# Patient Record
Sex: FEMALE | Race: WHITE | Employment: OTHER | URBAN - METROPOLITAN AREA
[De-identification: names, ages, dates, MRNs, and addresses within clinical notes are randomized per-mention and may not be internally consistent; named-entity substitution may affect disease eponyms.]

---

## 2017-04-19 ENCOUNTER — HOSPITAL ENCOUNTER (OUTPATIENT)
Dept: MAMMOGRAPHY | Age: 72
Discharge: HOME | End: 2017-04-19
Attending: FAMILY MEDICINE
Payer: MEDICARE

## 2017-04-19 DIAGNOSIS — R92.8 ABNORMAL MAMMOGRAM: ICD-10-CM

## 2017-04-19 PROCEDURE — 77066 DX MAMMO INCL CAD BI: CPT

## 2017-04-19 PROCEDURE — 76642 ULTRASOUND BREAST LIMITED: CPT

## 2018-03-27 PROBLEM — E66.01 SEVERE OBESITY (BMI 35.0-39.9) WITH COMORBIDITY (HCC): Status: ACTIVE | Noted: 2018-03-27

## 2018-04-30 ENCOUNTER — HOSPITAL ENCOUNTER (OUTPATIENT)
Dept: MAMMOGRAPHY | Age: 73
Discharge: HOME | End: 2018-04-30
Attending: FAMILY MEDICINE
Payer: MEDICARE

## 2018-04-30 DIAGNOSIS — Z12.31 VISIT FOR SCREENING MAMMOGRAM: ICD-10-CM

## 2018-04-30 PROCEDURE — 77067 SCR MAMMO BI INCL CAD: CPT

## 2018-07-12 ENCOUNTER — HOSPITAL ENCOUNTER (OUTPATIENT)
Dept: ULTRASOUND IMAGING | Age: 73
Discharge: HOME | End: 2018-07-12
Attending: FAMILY MEDICINE
Payer: MEDICARE

## 2018-07-12 DIAGNOSIS — M79.662 PAIN OF LEFT CALF: ICD-10-CM

## 2018-07-12 PROCEDURE — 93971 EXTREMITY STUDY: CPT

## 2018-11-05 ENCOUNTER — PATIENT OUTREACH (OUTPATIENT)
Dept: CASE MANAGEMENT | Age: 73
End: 2018-11-05

## 2018-11-05 NOTE — PROGRESS NOTES
Pt identified as MSSP Risk Level 4. Community Care Manager reviewed chart to evaluate for possible Complex Care Management needs. No complex care needs identified at present. Please consult community care management team if future needs arise. This note will not be viewable in [address]

## 2019-01-02 PROBLEM — Z79.899 ENCOUNTER FOR MEDICATION MANAGEMENT: Status: ACTIVE | Noted: 2019-01-02

## 2019-01-02 PROBLEM — R25.1 TREMOR: Status: ACTIVE | Noted: 2019-01-02

## 2019-01-02 PROBLEM — R29.3 POSTURAL IMBALANCE: Status: ACTIVE | Noted: 2019-01-02

## 2019-01-02 PROBLEM — G25.81 RESTLESS LEGS SYNDROME (RLS): Status: ACTIVE | Noted: 2019-01-02
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
## 2019-05-09 ENCOUNTER — HOSPITAL ENCOUNTER (OUTPATIENT)
Dept: MAMMOGRAPHY | Age: 74
Discharge: HOME | End: 2019-05-09
Attending: FAMILY MEDICINE
Payer: MEDICARE

## 2019-05-09 DIAGNOSIS — Z12.31 SCREENING MAMMOGRAM, ENCOUNTER FOR: ICD-10-CM

## 2019-05-09 PROCEDURE — 77067 SCR MAMMO BI INCL CAD: CPT

## 2019-05-15 ENCOUNTER — HOSPITAL ENCOUNTER (OUTPATIENT)
Dept: MAMMOGRAPHY | Age: 74
Discharge: HOME | End: 2019-05-15
Attending: FAMILY MEDICINE
Payer: MEDICARE

## 2019-05-15 DIAGNOSIS — R92.8 ABNORMAL SCREENING MAMMOGRAM: ICD-10-CM

## 2019-05-15 PROCEDURE — 76642 ULTRASOUND BREAST LIMITED: CPT

## 2019-05-15 PROCEDURE — 77065 DX MAMMO INCL CAD UNI: CPT

## 2020-01-09 ENCOUNTER — HOSPITAL ENCOUNTER (OUTPATIENT)
Dept: MAMMOGRAPHY | Age: 75
Discharge: HOME | End: 2020-01-09
Attending: FAMILY MEDICINE
Payer: MEDICARE

## 2020-01-09 ENCOUNTER — HOSPITAL ENCOUNTER (OUTPATIENT)
Dept: MAMMOGRAPHY | Age: 75
Discharge: HOME | End: 2020-01-09
Payer: MEDICARE

## 2020-01-09 DIAGNOSIS — N63.0 BREAST LUMP IN UPPER OUTER QUADRANT: ICD-10-CM

## 2020-01-09 DIAGNOSIS — R92.8 ABNORMAL MAMMOGRAM: ICD-10-CM

## 2020-01-09 PROCEDURE — 77066 DX MAMMO INCL CAD BI: CPT

## 2021-02-25 ENCOUNTER — TRANSCRIBE ORDER (OUTPATIENT)
Dept: SCHEDULING | Age: 76
End: 2021-02-25

## 2021-02-25 DIAGNOSIS — Z12.31 VISIT FOR SCREENING MAMMOGRAM: Primary | ICD-10-CM

## 2021-03-24 ENCOUNTER — HOSPITAL ENCOUNTER (OUTPATIENT)
Dept: MAMMOGRAPHY | Age: 76
Discharge: HOME | End: 2021-03-24
Attending: FAMILY MEDICINE
Payer: MEDICARE

## 2021-03-24 DIAGNOSIS — Z12.31 VISIT FOR SCREENING MAMMOGRAM: ICD-10-CM

## 2021-03-24 PROCEDURE — 77067 SCR MAMMO BI INCL CAD: CPT

## 2021-03-31 ENCOUNTER — HOSPITAL ENCOUNTER (OUTPATIENT)
Dept: MAMMOGRAPHY | Age: 76
Discharge: HOME | End: 2021-03-31
Attending: FAMILY MEDICINE
Payer: MEDICARE

## 2021-03-31 DIAGNOSIS — R92.8 ABNORMAL MAMMOGRAM: ICD-10-CM

## 2021-03-31 PROCEDURE — 77065 DX MAMMO INCL CAD UNI: CPT

## 2021-03-31 PROCEDURE — 76642 ULTRASOUND BREAST LIMITED: CPT

## 2021-04-07 ENCOUNTER — HOSPITAL ENCOUNTER (OUTPATIENT)
Dept: MAMMOGRAPHY | Age: 76
Discharge: HOME | End: 2021-04-07
Attending: FAMILY MEDICINE
Payer: MEDICARE

## 2021-04-07 VITALS — DIASTOLIC BLOOD PRESSURE: 67 MMHG | HEART RATE: 83 BPM | SYSTOLIC BLOOD PRESSURE: 161 MMHG

## 2021-04-07 DIAGNOSIS — N63.20 LEFT BREAST MASS: ICD-10-CM

## 2021-04-07 DIAGNOSIS — N63.0 BREAST MASS: ICD-10-CM

## 2021-04-07 PROCEDURE — 77065 DX MAMMO INCL CAD UNI: CPT

## 2021-04-07 PROCEDURE — 88305 TISSUE EXAM BY PATHOLOGIST: CPT

## 2021-04-07 PROCEDURE — 77030013267 US BX BREAST LT 1ST LESION W/CLIP AND SPECIMEN

## 2021-04-07 PROCEDURE — 74011000250 HC RX REV CODE- 250

## 2021-04-07 RX ORDER — LIDOCAINE HYDROCHLORIDE 10 MG/ML
3 INJECTION INFILTRATION; PERINEURAL
Status: COMPLETED | OUTPATIENT
Start: 2021-04-07 | End: 2021-04-07

## 2021-04-07 RX ADMIN — LIDOCAINE HYDROCHLORIDE 3 ML: 10 INJECTION, SOLUTION INFILTRATION; PERINEURAL at 09:23

## 2021-04-08 NOTE — PROGRESS NOTES
I spoke with Ms Mike Thorne regarding the results of her Lt breast U/S bx. Pathology: benign. She had no post bx issues or concerns. She is being referred to CS for surgical consult. I have LM for [address] for 1st available and will call pt back when appt has been made.

## 2021-05-11 NOTE — H&P (VIEW-ONLY)
Idell Pallas, DO 
[address], [address] [place] Phone [phone]   Fax [phone] Date of visit: 2021 Primary/Requesting provider: Clau Islas DO Chief Complaint Patient presents with  Follow-up  
  left breast radial scar Name: Marcus Cabrera MRN: [id number] : 1945 Age: 66 y.o. Sex: female PCP: Clau Islas DO  
 
 
CC:   
Chief Complaint Patient presents with  Follow-up  
  left breast radial scar HPI: 
 
 Marcus Cabrera is a 66 y.o. female who presents for evaluation of left breast radial scar. Patient follows up in the office today to discuss details of procedure with Dr. Codi Sutherland. I was able to review her case and speak to him by telephone and left breast mastectomy with reconstruction and right breast reduction mammoplasty are planned. Today we discussed the procedure in detail. I discussed my part of the procedure details. She reports no new changes. She is a left breast radial scar and she is here today to discuss and plan for surgery. Constantino Cabrera is a 66 y.o. female who presents for evaluation of left breast radial scar. This is a 75-year-old female with previous personal history of left breast cancer in . Patient developed breast cancer and underwent a left breast partial mastectomy with no lymph node interrogation. There is no pathology for review. Postoperatively the patient completed radiation therapy to the left breast and now has a contracted left breast.  She has been very diligent with her self breast exams and annual screening mammograms. On recent screening mammogram she developed a suspicious abnormality which is led to biopsy. Biopsy has revealed a left breast radial scar. She is here today for evaluation of radial scar and possible surgery. The patient has several concerns.   She states that her right breast since radiation is significantly larger than the left breast.  She also has deformity of the left breast after the radiation. Upfront she states that she is considering 4 4 possibilities. #1 do nothing. #2 partial mastectomy. #3 bilateral mastectomy with reconstruction. #4 bilateral mastectomy without reconstruction. She is here today for evaluation for radial scar and surgical recommendations. .  
  
 DIAGNOSIS  
A: \" LEFT BREAST, 12:00 POSITION, 3 CM FROM NIPPLE, CORE BIOPSY\":  
FIBROCYSTIC MASTOPATHY HAVING RADIAL SCAR, MODERATE INTRADUCTAL HYPERPLASIA, APOCRINE METAPLASIA, MICROCALCIFICATIONS AND FOCAL FAT NECROSIS  
jtl/4/8/2021 Electronically signed out on 4/8/2021 06:00 by SHERRY Reich M.D.  
  
LEFT DIAGNOSTIC DIGITAL MAMMOGRAPHY (ADDITIONAL VIEWS), 
LEFT BREAST SONOGRAPHY:  
  
CLINICAL HISTORY:  Followup indeterminate screening mammogram in a 40-year-old 
status post remote left lumpectomy and radiation therapy. 
  
LEFT MAMMOGRAM:  Straight lateral and spot compression views are compared with 
March 24, 2021 and earlier studies.  The presence of a small superficial nodule 
with ill-defined margins at the anterior 12:00 position corresponding to the 
finding on the screening mammogram. Rubio Rain is an adjacent coarse calcification. 
  
LEFT BREAST ULTRASOUND:  Images from targeted ultrasound evaluation demonstrate 
an 8 mm heterogeneous hypoechoic nodule associated with acoustical shadowing 
superficially at the 12:00 position 3 cm from the nipple corresponding well in 
size, configuration, and position with the mammographic nodule.  While fat 
necrosis and other benign processes are possible, the possibility of malignancy 
is suggested in this patient with a history of ipsilateral breast cancer. Biopsy is indicated. 
  
IMPRESSION 
  
AN 8 MM HETEROGENEOUS SHADOWING ANTERIOR 12:00 NODULE IS SUSPICIOUS FOR 
MALIGNANCY, AND BIOPSY IS RECOMMENDED.  
  
  
BI-RADS Assessment Category 4: Suspicious Finding- Biopsy should be considered. 
  
Preliminary results were reported by Jose Members to the nurse line voicemail 
in Dr. Kodak Clifford office at 15:58 hours. Suzy Cross patient has been scheduled for left 
breast biopsy with ultrasound guidance on April 7, 2021 at 09:00 hours. 
  
 
PMH: 
 
Past Medical History:  
Diagnosis Date  Abnormal serum cholesterol  Allergic rhinitis  Arthritis   
 bilat hands & toes  Asthma  Breast cancer ([address]  Cancer [facility]) 1998  
 lumpectomy left breast 1998 and radiation  Chronic knee pain 10/21/2016  Chronic sinusitis  Degenerative cervical spinal stenosis  Depression   
 depression - pt takes zoloft  Deviated nasal septum  Difficult intubation Grade 3 view on DL - glidescope used successfully  Disturbance of salivary secretion  GERD (gastroesophageal reflux disease)   
 controlled with medication  Hoarseness  Hypercholesterolemia   
 controlled with medication  Hyperlipidemia  Hypertension   
 controlled with medication  Hypokalemia 10/21/2016  Hypothyroidism  Multilevel degenerative disc disease  Musculoskeletal pain  Nausea & vomiting POV with GA - does not  Postmenopausal bleeding  Prolapse of female genital organs  Sinus problem  SNHL (sensorineural hearing loss)  Thyroid disease   
 hypo - controlled with medication  Thyroid nodule  Unspecified sleep apnea   
 pt wears CPAP at night  Uterine prolapse PSH: 
 
Past Surgical History:  
Procedure Laterality Date  HX APPENDECTOMY  HX BREAST LUMPECTOMY Left   
 left lumpectomy and radiation  HX HEENT    
 palatal surgery  HX HEENT    
 sinus  HX HEENT Right 9/29/11 Balloon frontal sinuplasty/ irrigation and culture max sinus  HX HYSTERECTOMY  HX KNEE REPLACEMENT Right 7/8/14 TKR  HX KNEE REPLACEMENT Left TKR  HX ORTHOPAEDIC    
 finger growths  HX ORTHOPAEDIC    
 left thumb  HX ORTHOPAEDIC left foot bunion  HX ORTHOPAEDIC Right 4/2016 Toes on right foot  HX SEPTOPLASTY  7/8/04 Septo/ FESS  
 HX TONSILLECTOMY  HX UVULOPALATOPHARYNGOPLASTY  GA CYSTOTOMY,EXCIS BLADDER TUMOR    
 cysto X 8- last one 2016 MEDS: 
 
Current Outpatient Medications Medication Sig  
 fluticasone propionate (FLONASE) 50 mcg/actuation nasal spray SPRAY 1 SPRAY INTO EACH NOSTRIL TWICE A DAY  celecoxib (CELEBREX) 200 mg capsule  budesonide-formoteroL (SYMBICORT) 160-4.5 mcg/actuation HFAA Take 2 Puffs by inhalation two (2) times a day.  albuterol (PROVENTIL HFA, VENTOLIN HFA, PROAIR HFA) 90 mcg/actuation inhaler TAKE 2 PUFFS BY MOUTH EVERY 6 HOURS AS NEEDED FOR WHEEZE  esomeprazole (NEXIUM) 40 mg capsule TAKE ONE CAPSULE BY MOUTH TWICE A DAY  venlafaxine-SR (EFFEXOR-XR) 75 mg capsule TAKE 3 CAPSULES BY MOUTH IN THE MORNING  
 atorvastatin (LIPITOR) 20 mg tablet TAKE 1 TAB BY MOUTH DAILY.  levothyroxine (SYNTHROID) 200 mcg tablet TAKE 1 TAB BY MOUTH DAILY (BEFORE BREAKFAST).  varicella-zoster recombinant, PF, (SHINGRIX, PF,) 50 mcg/0.5 mL susr injection 0.5mL by IntraMUSCular route once now and then repeat in 2-6 months  levothyroxine (SYNTHROID) 75 mcg tablet TAKE 1 TABLET EVERY MORNING BEFORE BREAKFAST  hydroCHLOROthiazide (HYDRODIURIL) 25 mg tablet TAKE 1 TABLET EVERY DAY  amLODIPine (NORVASC) 10 mg tablet TAKE 1 TAB BY MOUTH DAILY.  metoprolol succinate (TOPROL-XL) 50 mg XL tablet Take 1 Tab by mouth daily.  potassium chloride (KLOR-CON M20) 20 mEq tablet Take 1 Tab by mouth two (2) times a day.  desloratadine (CLARINEX) 5 mg tablet Take 5 mg by mouth every morning.  azelastine (ASTELIN) 137 mcg (0.1 %) nasal spray 1 [place] by Both Nostrils route two (2) times a day. Use in each nostril as directed  cholecalciferol (VITAMIN D3) 1,000 unit cap Take  by mouth every morning. Last dose 3/28/16  Magnesium Oxide 500 mg cap Take  by mouth every morning. Last dose 3/28/16  GLUCOSAMINE/CONDROITIN/HRB#182 (COSAMIN ASU PO) Take  by mouth every morning. Last dose 3/28/16  CALCIUM CARB/VIT D3/MINERALS (CALTRATE PLUS PO) take  by mouth every morning.  VITAMIN E ACETATE (VITAMIN E PO) Take  by mouth every morning. Last dose 3/28/16  MULTIVITAMINS W-MINERALS/LUT (CENTRUM SILVER PO) Take  by mouth every morning. Last dose 3/28/16  aspirin 81 mg Tab Take 81 mg by mouth nightly. LAST DOSE 3/28/16 No current facility-administered medications for this visit. ALLERGIES:   
 
Allergies Allergen Reactions  Cefaclor Hives and Diarrhea IV OK Other reaction(s): Hives/Swelling-Allergy  Cefdinir Hives and Diarrhea IV OK Other reaction(s): Hives/Swelling-Allergy  Cefuroxime Axetil Hives and Diarrhea IV OK Other reaction(s): Hives/Swelling-Allergy  Clarithromycin Hives and Diarrhea IV is OK Other reaction(s): Hives/Swelling-Allergy  Fluconazole Hives and Diarrhea Other reaction(s): Hives/Swelling-Allergy  Moxifloxacin Hives and Diarrhea IV is OK Other reaction(s): Hives/Swelling-Allergy  Penicillins Hives and Diarrhea IV OK Other reaction(s): Diarrhea-Intolerance, Hives/Swelling-Allergy  Shellfish Containing Products Hives and Diarrhea Other reaction(s): Hives/Swelling-Allergy  Ciprocinonide Hives and Diarrhea IV OK  Other Medication Diarrhea Carrots, pork & cheese cause diarrhea - LACTOSE INTOLERANT. Pine & mold causes upper respiratory complications.  Other Plant, Animal, Environmental Unknown (comments) Mold spores, pine  Cephalexin Other (comments) Abdominal pain Other reaction(s): Other- (not listed) - Allergy Abdominal pain/diarrhea SH: Social History Tobacco Use  Smoking status: Never Smoker  Smokeless tobacco: Never Used Substance Use Topics  Alcohol use: Yes Comment: occ beer  Drug use: No  
 
 
FH: 
 
Family History Problem Relation Age of Onset  Hypertension Mother  Stroke Mother  Hypertension Sister  Hypertension Other  Elevated Lipids Other Review of Systems Constitutional: Negative. HENT: Negative. Eyes: Negative. Respiratory: Negative. Cardiovascular: Negative. Gastrointestinal: Negative. Genitourinary: Negative. Musculoskeletal: Negative. Skin:  
     Left breast radial scar Neurological: Negative. Endo/Heme/Allergies: Negative. Psychiatric/Behavioral: Negative. Physical Exam:  
 
Visit Vitals /84 Pulse 70 Wt 240 lb (108.9 kg) BMI 37.59 kg/m² Physical Exam 
HENT:  
   Head: Normocephalic and atraumatic. Eyes:  
   Pupils: Pupils are equal, round, and reactive to light. Neck: Musculoskeletal: Normal range of motion and neck supple. Thyroid: No thyromegaly. Trachea: No tracheal deviation. Cardiovascular:  
   Rate and Rhythm: Normal rate and regular rhythm. Heart sounds: Normal heart sounds. No murmur. Pulmonary:  
   Effort: Pulmonary effort is normal. No respiratory distress. Breath sounds: Normal breath sounds. No wheezing or rales. Abdominal:  
   General: Bowel sounds are normal. There is no distension. Palpations: Abdomen is soft. There is no mass. Tenderness: There is no abdominal tenderness. There is no guarding or rebound. Musculoskeletal: Normal range of motion. Skin: 
   General: Skin is warm and dry. Findings: No erythema. Neurological:  
   Mental Status: She is alert and oriented to person, place, and time. Psychiatric:     
   Mood and Affect: Mood normal.     
   Judgment: Judgment normal.  
 
 
 
Assessment/Plan:  Renetta Ulrich is a 66 y.o. female who has signs and symptoms consistent with left breast radial scar with personal history of breast cancer. Today we are planning left breast simple mastectomy with reconstruction by Dr. Tammie Rivas.   He will perform reduction mammoplasty on the right. We discussed the details the procedures and she will be scheduled next available date and time. ICD-10-CM ICD-9-CM 1. Radial scar of left breast  N64.89 611.89 I went through the risks of bleeding, infection and anesthesia. Counseling time:counseling time more than 50% of visit: 1 hour was utilized in office visit today 50% times a face-to-face discussion explaining details of the surgery. We discussed radial scar along with her personal history of breast cancer and radiation to that side. We discussed the potential of radial scar being upstaged to a cancer on final pathology. We all feel that it is best to perform a left breast mastectomy. She is ready to pursue reconstruction and reduction mammoplasty on the right. We discussed all the details and postoperative expectations and she will be scheduled next verbal date and time.  
 
Signed: Elo Bacon DO  
5/11/2021  3:18 PM

## 2021-05-17 ENCOUNTER — ANESTHESIA EVENT (OUTPATIENT)
Dept: SURGERY | Age: 76
End: 2021-05-17
Payer: MEDICARE

## 2021-05-18 ENCOUNTER — HOSPITAL ENCOUNTER (OUTPATIENT)
Age: 76
Setting detail: OUTPATIENT SURGERY
Discharge: HOME | End: 2021-05-18
Attending: SURGERY | Admitting: SURGERY
Payer: MEDICARE

## 2021-05-18 ENCOUNTER — ANESTHESIA (OUTPATIENT)
Dept: SURGERY | Age: 76
End: 2021-05-18
Payer: MEDICARE

## 2021-05-18 VITALS
HEART RATE: 69 BPM | WEIGHT: 239 LBS | SYSTOLIC BLOOD PRESSURE: 136 MMHG | BODY MASS INDEX: 39.77 KG/M2 | DIASTOLIC BLOOD PRESSURE: 61 MMHG | RESPIRATION RATE: 61 BRPM | TEMPERATURE: 97.4 F | OXYGEN SATURATION: 100 %

## 2021-05-18 DIAGNOSIS — L76.82 PAIN AT SURGICAL INCISION: Primary | ICD-10-CM

## 2021-05-18 DIAGNOSIS — N64.89 RADIAL SCAR OF LEFT BREAST: ICD-10-CM

## 2021-05-18 DIAGNOSIS — Z85.3 PERSONAL HISTORY OF BREAST CANCER: ICD-10-CM

## 2021-05-18 LAB
CREAT SERPL-MCNC: 0.76 MG/DL (ref 0.6–1)
HGB BLD-MCNC: 13.6 G/DL (ref 11.7–15.4)
POTASSIUM BLD-SCNC: 3.6 MMOL/L (ref 3.5–5.1)

## 2021-05-18 PROCEDURE — 77030008462 HC STPLR SKN PROX J&J -A: Performed by: SURGERY

## 2021-05-18 PROCEDURE — 77030002912 HC SUT ETHBND J&J -A: Performed by: SURGERY

## 2021-05-18 PROCEDURE — 76010000132 HC OR TIME 2.5 TO 3 HR: Performed by: SURGERY

## 2021-05-18 PROCEDURE — 74011250636 HC RX REV CODE- 250/636: Performed by: SURGERY

## 2021-05-18 PROCEDURE — 93005 ELECTROCARDIOGRAM TRACING: CPT | Performed by: ANESTHESIOLOGY

## 2021-05-18 PROCEDURE — 74011000250 HC RX REV CODE- 250: Performed by: NURSE ANESTHETIST, CERTIFIED REGISTERED

## 2021-05-18 PROCEDURE — 77030041445 HC PENCL ELECT SMK EVAC STRY -B: Performed by: SURGERY

## 2021-05-18 PROCEDURE — 74011250637 HC RX REV CODE- 250/637: Performed by: ANESTHESIOLOGY

## 2021-05-18 PROCEDURE — 77030002996 HC SUT SLK J&J -A: Performed by: SURGERY

## 2021-05-18 PROCEDURE — 77030038552 HC DRN WND MDII -A: Performed by: SURGERY

## 2021-05-18 PROCEDURE — 77030040361 HC SLV COMPR DVT MDII -B: Performed by: SURGERY

## 2021-05-18 PROCEDURE — 74011250636 HC RX REV CODE- 250/636: Performed by: ANESTHESIOLOGY

## 2021-05-18 PROCEDURE — 74011250636 HC RX REV CODE- 250/636: Performed by: NURSE ANESTHETIST, CERTIFIED REGISTERED

## 2021-05-18 PROCEDURE — 19303 MAST SIMPLE COMPLETE: CPT | Performed by: SURGERY

## 2021-05-18 PROCEDURE — 77030040922 HC BLNKT HYPOTHRM STRY -A: Performed by: ANESTHESIOLOGY

## 2021-05-18 PROCEDURE — 84132 ASSAY OF SERUM POTASSIUM: CPT

## 2021-05-18 PROCEDURE — 77030036554: Performed by: SURGERY

## 2021-05-18 PROCEDURE — 77030031139 HC SUT VCRL2 J&J -A: Performed by: SURGERY

## 2021-05-18 PROCEDURE — 82565 ASSAY OF CREATININE: CPT

## 2021-05-18 PROCEDURE — 77030002916 HC SUT ETHLN J&J -A: Performed by: SURGERY

## 2021-05-18 PROCEDURE — 85018 HEMOGLOBIN: CPT

## 2021-05-18 PROCEDURE — 77030021678 HC GLIDESCP STAT DISP VERT -B: Performed by: ANESTHESIOLOGY

## 2021-05-18 PROCEDURE — 76210000020 HC REC RM PH II FIRST 0.5 HR: Performed by: SURGERY

## 2021-05-18 PROCEDURE — 77030002934 HC SUT MCRYL J&J -B: Performed by: SURGERY

## 2021-05-18 PROCEDURE — 74011000250 HC RX REV CODE- 250: Performed by: SURGERY

## 2021-05-18 PROCEDURE — 76060000036 HC ANESTHESIA 2.5 TO 3 HR: Performed by: SURGERY

## 2021-05-18 PROCEDURE — 77030011283 HC ELECTRD NDL COVD -A: Performed by: SURGERY

## 2021-05-18 PROCEDURE — 77030011264 HC ELECTRD BLD EXT COVD -A: Performed by: SURGERY

## 2021-05-18 PROCEDURE — 2709999900 HC NON-CHARGEABLE SUPPLY: Performed by: SURGERY

## 2021-05-18 PROCEDURE — 77030037088 HC TUBE ENDOTRACH ORAL NSL COVD-A: Performed by: ANESTHESIOLOGY

## 2021-05-18 PROCEDURE — C1789 PROSTHESIS, BREAST, IMP: HCPCS | Performed by: SURGERY

## 2021-05-18 PROCEDURE — 88309 TISSUE EXAM BY PATHOLOGIST: CPT

## 2021-05-18 PROCEDURE — 77030008534 HC TBNG LIPOSUC BYRO -B: Performed by: SURGERY

## 2021-05-18 PROCEDURE — 76210000006 HC OR PH I REC 0.5 TO 1 HR: Performed by: SURGERY

## 2021-05-18 PROCEDURE — 77030008536 HC TBNG LIPO SUC GRAM -A: Performed by: SURGERY

## 2021-05-18 DEVICE — SILTEX MEDIUM HEIGHT TISSUE EXPANDER STYLE 9200, 350CC
Type: IMPLANTABLE DEVICE | Site: BREAST | Status: FUNCTIONAL
Brand: MENTOR CPX 4 BREAST TISSUE EXPANDER WITH SUTURE TABS

## 2021-05-18 RX ORDER — LIDOCAINE HYDROCHLORIDE 10 MG/ML
0.1 INJECTION INFILTRATION; PERINEURAL AS NEEDED
Status: DISCONTINUED | OUTPATIENT
Start: 2021-05-18 | End: 2021-05-18 | Stop reason: HOSPADM

## 2021-05-18 RX ORDER — CLINDAMYCIN PHOSPHATE 900 MG/50ML
900 INJECTION INTRAVENOUS ONCE
Status: COMPLETED | OUTPATIENT
Start: 2021-05-18 | End: 2021-05-18

## 2021-05-18 RX ORDER — OXYCODONE HYDROCHLORIDE 5 MG/1
5 TABLET ORAL
Status: DISCONTINUED | OUTPATIENT
Start: 2021-05-18 | End: 2021-05-18 | Stop reason: HOSPADM

## 2021-05-18 RX ORDER — NEOSTIGMINE METHYLSULFATE 1 MG/ML
INJECTION, SOLUTION INTRAVENOUS AS NEEDED
Status: DISCONTINUED | OUTPATIENT
Start: 2021-05-18 | End: 2021-05-18 | Stop reason: HOSPADM

## 2021-05-18 RX ORDER — ONDANSETRON 2 MG/ML
INJECTION INTRAMUSCULAR; INTRAVENOUS AS NEEDED
Status: DISCONTINUED | OUTPATIENT
Start: 2021-05-18 | End: 2021-05-18 | Stop reason: HOSPADM

## 2021-05-18 RX ORDER — DIAZEPAM 2 MG/1
5 TABLET ORAL
Qty: 20 TAB | Refills: 0 | Status: SHIPPED | OUTPATIENT
Start: 2021-05-18 | End: 2021-08-26 | Stop reason: ALTCHOICE

## 2021-05-18 RX ORDER — MIDAZOLAM HYDROCHLORIDE 1 MG/ML
2 INJECTION, SOLUTION INTRAMUSCULAR; INTRAVENOUS
Status: DISCONTINUED | OUTPATIENT
Start: 2021-05-18 | End: 2021-05-18 | Stop reason: HOSPADM

## 2021-05-18 RX ORDER — FENTANYL CITRATE 50 UG/ML
INJECTION, SOLUTION INTRAMUSCULAR; INTRAVENOUS AS NEEDED
Status: DISCONTINUED | OUTPATIENT
Start: 2021-05-18 | End: 2021-05-18 | Stop reason: HOSPADM

## 2021-05-18 RX ORDER — NALOXONE HYDROCHLORIDE 0.4 MG/ML
0.2 INJECTION, SOLUTION INTRAMUSCULAR; INTRAVENOUS; SUBCUTANEOUS
Status: DISCONTINUED | OUTPATIENT
Start: 2021-05-18 | End: 2021-05-18 | Stop reason: HOSPADM

## 2021-05-18 RX ORDER — PROPOFOL 10 MG/ML
INJECTION, EMULSION INTRAVENOUS AS NEEDED
Status: DISCONTINUED | OUTPATIENT
Start: 2021-05-18 | End: 2021-05-18 | Stop reason: HOSPADM

## 2021-05-18 RX ORDER — DEXAMETHASONE SODIUM PHOSPHATE 4 MG/ML
INJECTION, SOLUTION INTRA-ARTICULAR; INTRALESIONAL; INTRAMUSCULAR; INTRAVENOUS; SOFT TISSUE AS NEEDED
Status: DISCONTINUED | OUTPATIENT
Start: 2021-05-18 | End: 2021-05-18 | Stop reason: HOSPADM

## 2021-05-18 RX ORDER — GLYCOPYRROLATE 0.2 MG/ML
INJECTION INTRAMUSCULAR; INTRAVENOUS AS NEEDED
Status: DISCONTINUED | OUTPATIENT
Start: 2021-05-18 | End: 2021-05-18 | Stop reason: HOSPADM

## 2021-05-18 RX ORDER — ONDANSETRON 2 MG/ML
4 INJECTION INTRAMUSCULAR; INTRAVENOUS
Status: DISCONTINUED | OUTPATIENT
Start: 2021-05-18 | End: 2021-05-18 | Stop reason: HOSPADM

## 2021-05-18 RX ORDER — LIDOCAINE HYDROCHLORIDE 20 MG/ML
INJECTION, SOLUTION EPIDURAL; INFILTRATION; INTRACAUDAL; PERINEURAL AS NEEDED
Status: DISCONTINUED | OUTPATIENT
Start: 2021-05-18 | End: 2021-05-18 | Stop reason: HOSPADM

## 2021-05-18 RX ORDER — SODIUM CHLORIDE, SODIUM LACTATE, POTASSIUM CHLORIDE, CALCIUM CHLORIDE 600; 310; 30; 20 MG/100ML; MG/100ML; MG/100ML; MG/100ML
75 INJECTION, SOLUTION INTRAVENOUS CONTINUOUS
Status: DISCONTINUED | OUTPATIENT
Start: 2021-05-18 | End: 2021-05-18 | Stop reason: HOSPADM

## 2021-05-18 RX ORDER — NALOXONE HYDROCHLORIDE 0.4 MG/ML
0.2 INJECTION, SOLUTION INTRAMUSCULAR; INTRAVENOUS; SUBCUTANEOUS AS NEEDED
Status: DISCONTINUED | OUTPATIENT
Start: 2021-05-18 | End: 2021-05-18 | Stop reason: HOSPADM

## 2021-05-18 RX ORDER — HALOPERIDOL 5 MG/ML
1 INJECTION INTRAMUSCULAR
Status: DISCONTINUED | OUTPATIENT
Start: 2021-05-18 | End: 2021-05-18 | Stop reason: HOSPADM

## 2021-05-18 RX ORDER — BUPIVACAINE HYDROCHLORIDE AND EPINEPHRINE 2.5; 5 MG/ML; UG/ML
INJECTION, SOLUTION EPIDURAL; INFILTRATION; INTRACAUDAL; PERINEURAL AS NEEDED
Status: DISCONTINUED | OUTPATIENT
Start: 2021-05-18 | End: 2021-05-18 | Stop reason: HOSPADM

## 2021-05-18 RX ORDER — SULFAMETHOXAZOLE AND TRIMETHOPRIM 400; 80 MG/1; MG/1
1 TABLET ORAL 2 TIMES DAILY
Qty: 28 TAB | Refills: 0 | Status: SHIPPED | OUTPATIENT
Start: 2021-05-18 | End: 2021-08-26 | Stop reason: ALTCHOICE

## 2021-05-18 RX ORDER — FENTANYL CITRATE 50 UG/ML
100 INJECTION, SOLUTION INTRAMUSCULAR; INTRAVENOUS ONCE
Status: DISCONTINUED | OUTPATIENT
Start: 2021-05-18 | End: 2021-05-18 | Stop reason: HOSPADM

## 2021-05-18 RX ORDER — SODIUM CHLORIDE, SODIUM LACTATE, POTASSIUM CHLORIDE, CALCIUM CHLORIDE 600; 310; 30; 20 MG/100ML; MG/100ML; MG/100ML; MG/100ML
25 INJECTION, SOLUTION INTRAVENOUS CONTINUOUS
Status: DISCONTINUED | OUTPATIENT
Start: 2021-05-18 | End: 2021-05-18 | Stop reason: HOSPADM

## 2021-05-18 RX ORDER — HYDROCODONE BITARTRATE AND ACETAMINOPHEN 5; 325 MG/1; MG/1
1 TABLET ORAL
Qty: 30 TAB | Refills: 0 | Status: SHIPPED | OUTPATIENT
Start: 2021-05-18 | End: 2021-05-23

## 2021-05-18 RX ORDER — ACETAMINOPHEN 500 MG
1000 TABLET ORAL ONCE
Status: COMPLETED | OUTPATIENT
Start: 2021-05-18 | End: 2021-05-18

## 2021-05-18 RX ORDER — MIDAZOLAM HYDROCHLORIDE 1 MG/ML
2 INJECTION, SOLUTION INTRAMUSCULAR; INTRAVENOUS ONCE
Status: COMPLETED | OUTPATIENT
Start: 2021-05-18 | End: 2021-05-18

## 2021-05-18 RX ORDER — HYDROMORPHONE HYDROCHLORIDE 1 MG/ML
0.5 INJECTION, SOLUTION INTRAMUSCULAR; INTRAVENOUS; SUBCUTANEOUS
Status: DISCONTINUED | OUTPATIENT
Start: 2021-05-18 | End: 2021-05-18 | Stop reason: HOSPADM

## 2021-05-18 RX ORDER — ROCURONIUM BROMIDE 10 MG/ML
INJECTION, SOLUTION INTRAVENOUS AS NEEDED
Status: DISCONTINUED | OUTPATIENT
Start: 2021-05-18 | End: 2021-05-18 | Stop reason: HOSPADM

## 2021-05-18 RX ADMIN — LIDOCAINE HYDROCHLORIDE 60 MG: 20 INJECTION, SOLUTION EPIDURAL; INFILTRATION; INTRACAUDAL; PERINEURAL at 14:10

## 2021-05-18 RX ADMIN — ACETAMINOPHEN 1000 MG: 500 TABLET, FILM COATED ORAL at 12:00

## 2021-05-18 RX ADMIN — CLINDAMYCIN PHOSPHATE 900 MG: 900 INJECTION, SOLUTION INTRAVENOUS at 14:13

## 2021-05-18 RX ADMIN — GLYCOPYRROLATE 0.4 MG: 0.2 INJECTION, SOLUTION INTRAMUSCULAR; INTRAVENOUS at 16:22

## 2021-05-18 RX ADMIN — ROCURONIUM BROMIDE 50 MG: 10 INJECTION, SOLUTION INTRAVENOUS at 14:11

## 2021-05-18 RX ADMIN — Medication 3 MG: at 16:22

## 2021-05-18 RX ADMIN — DEXAMETHASONE SODIUM PHOSPHATE 4 MG: 4 INJECTION, SOLUTION INTRAMUSCULAR; INTRAVENOUS at 16:22

## 2021-05-18 RX ADMIN — MIDAZOLAM 2 MG: 1 INJECTION INTRAMUSCULAR; INTRAVENOUS at 13:56

## 2021-05-18 RX ADMIN — ONDANSETRON 4 MG: 2 INJECTION INTRAMUSCULAR; INTRAVENOUS at 16:22

## 2021-05-18 RX ADMIN — SODIUM CHLORIDE, SODIUM LACTATE, POTASSIUM CHLORIDE, AND CALCIUM CHLORIDE 25 ML/HR: 600; 310; 30; 20 INJECTION, SOLUTION INTRAVENOUS at 12:00

## 2021-05-18 RX ADMIN — FENTANYL CITRATE 100 MCG: 50 INJECTION INTRAMUSCULAR; INTRAVENOUS at 14:10

## 2021-05-18 RX ADMIN — PROPOFOL 150 MG: 10 INJECTION, EMULSION INTRAVENOUS at 14:10

## 2021-05-18 NOTE — PERIOP NOTES
at bedside. Discharge instructions reviewed with patient and her .  They voice understanding at this time with no questions or concerns

## 2021-05-18 NOTE — PERIOP NOTES
PACU DISCHARGE NOTE  Vital signs stable, pain well controlled, alert and oriented times three or at baseline, no anesthetic complications. IV removed with catheter tip intact. Written and verbal discharge instructions given, including pain control and follow up appointment. Spouse, Alexx verbalized understanding and signed discharge instructions. All questions answered prior to discharge. Dr Gulshan Odell reviewed EKG and has no orders regarding chest pain. Ekg reviewed with patient and spouse. Dr Gulshan Odell okay to discharge at this time. Pt and all belongings taken via wheelchair and safely put in vehicle. 07-Mar-2018 07-Mar-2018 08:55

## 2021-05-18 NOTE — ANESTHESIA PREPROCEDURE EVALUATION
Relevant Problems   RESPIRATORY SYSTEM   (+) Asthma      NEUROLOGY   (+) Depression      CARDIOVASCULAR   (+) Hypertension      GASTROINTESTINAL   (+) GERD (gastroesophageal reflux disease)      ENDOCRINE   (+) Hypothyroidism   (+) Severe obesity (BMI 35.0-39. 9) with comorbidity (HCC)       Anesthetic History     Increased risk of difficult airway (Grade 3 DL- successful glidescope use) and PONV          Review of Systems / Medical History  Patient summary reviewed, nursing notes reviewed and pertinent labs reviewed    Pulmonary        Sleep apnea: CPAP    Asthma : well controlled       Neuro/Psych         Psychiatric history     Cardiovascular    Hypertension: well controlled          Hyperlipidemia    Exercise tolerance: >4 METS     GI/Hepatic/Renal     GERD: well controlled           Endo/Other      Hypothyroidism: well controlled  Morbid obesity, arthritis and cancer     Other Findings            Physical Exam    Airway  Mallampati: III      Mouth opening: Normal     Cardiovascular  Regular rate and rhythm,  S1 and S2 normal,  no murmur, click, rub, or gallop             Dental  No notable dental hx       Pulmonary  Breath sounds clear to auscultation               Abdominal         Other Findings            Anesthetic Plan    ASA: 3  Anesthesia type: general          Induction: Intravenous  Anesthetic plan and risks discussed with: Patient      Plan glidescope

## 2021-05-18 NOTE — DISCHARGE INSTRUCTIONS
Keep dressings in place and dry until return to office   No lifting more than 5 lbs  Measure output of drains daily , record the drainage and bring record to you appointment. After general anesthesia or intravenous sedation, for 24 hours or while taking prescription Narcotics:  · Limit your activities  · A responsible adult needs to be with you for the next 24 hours  · Do not drive and operate hazardous machinery  · Do not make important personal or business decisions  · Do not drink alcoholic beverages  · If you have not urinated within 8 hours after discharge, and you are experiencing discomfort from urinary retention, please go to the nearest ED. · If you have sleep apnea and have a CPAP machine, please use it for all naps and sleeping. · Please use caution when taking narcotics and any of your home medications that may cause drowsiness. *  Please give a list of your current medications to your Primary Care Provider. *  Please update this list whenever your medications are discontinued, doses are      changed, or new medications (including over-the-counter products) are added. *  Please carry medication information at all times in case of emergency situations. These are general instructions for a healthy lifestyle:  No smoking/ No tobacco products/ Avoid exposure to second hand smoke  Surgeon General's Warning:  Quitting smoking now greatly reduces serious risk to your health. Obesity, smoking, and sedentary lifestyle greatly increases your risk for illness  A healthy diet, regular physical exercise & weight monitoring are important for maintaining a healthy lifestyle    You may be retaining fluid if you have a history of heart failure or if you experience any of the following symptoms:  Weight gain of 3 pounds or more overnight or 5 pounds in a week, increased swelling in our hands or feet or shortness of breath while lying flat in bed.   Please call your doctor as soon as you notice any of these symptoms; do not wait until your next office visit.

## 2021-05-18 NOTE — ANESTHESIA POSTPROCEDURE EVALUATION
Procedure(s):  LEFT BREAST MASTECTOMY PREOP 11:30/ SURGERY 1:00  LEFT BREAST RECONSTRUCTION  LEFT BREAST TISSUE EXPANDER INSERTION. general    Anesthesia Post Evaluation      Multimodal analgesia: multimodal analgesia used between 6 hours prior to anesthesia start to PACU discharge  Patient location during evaluation: PACU  Patient participation: complete - patient participated  Level of consciousness: awake and alert  Pain management: adequate  Airway patency: patent  Anesthetic complications: no  Cardiovascular status: acceptable (has chest discomfort that I feel is likely surgical- checked EKG, infer Qs with non-specif Twave changes (based on care everywhere EKG reports- no sign change from priors))  Respiratory status: acceptable, spontaneous ventilation and nonlabored ventilation  Hydration status: acceptable  Post anesthesia nausea and vomiting:  none      INITIAL Post-op Vital signs:   Vitals Value Taken Time   /61 05/18/21 1733   Temp 36.3 °C (97.4 °F) 05/18/21 1723   Pulse 75 05/18/21 1734   Resp 25 05/18/21 1734   SpO2 100 % 05/18/21 1734   Vitals shown include unvalidated device data.

## 2021-05-18 NOTE — BRIEF OP NOTE
Brief Postoperative Note    Patient: Destin Marshall  YOB: 1945  MRN: [id number]    Date of Procedure: 5/18/2021     Pre-Op Diagnosis: Carcinoma of nipple and areola of female breast, left ([facility] [address] [C50.012]    Post-Op Diagnosis: Left breast radial scar and personal history of left breast cancer      Procedure(s):  LEFT BREAST MASTECTOMY CPT 41577    Surgeon(s):  MD Arleen Mota Waldon [place], DO    Surgical Assistant: None    Anesthesia: General     Estimated Blood Loss (mL): Minimal    Complications: None    Specimens:   ID Type Source Tests Collected by Time Destination   1 : Left Breast- Suture- Long Lateral/ Short Superior Preservative Breast  Paul Canal,  5/18/2021 1429 Pathology        Implants: * No implants in log *    Drains: * No LDAs found *    Findings: Left breast mastectomy without gross findings to suggest metastatic disease beyond resection margins. No palpable LN.     Electronically Signed by Moose Norwood DO on 5/18/2021 at 3:09 PM  [id number]

## 2021-05-18 NOTE — INTERVAL H&P NOTE
Update History & Physical    The Patient's History and Physical of May 11,   2021 was reviewed with the patient and I examined the patient. There was no change. The surgical site was confirmed by the patient and me. Plan:  The risk, benefits, expected outcome, and alternative to the recommended procedure have been discussed with the patient. Patient understands and wants to proceed with the procedure.     Electronically signed by Arianna Slade DO on 5/18/2021 at 1:14 PM

## 2021-05-19 LAB
ATRIAL RATE: 67 BPM
CALCULATED P AXIS, ECG09: 46 DEGREES
CALCULATED R AXIS, ECG10: 43 DEGREES
CALCULATED T AXIS, ECG11: 104 DEGREES
DIAGNOSIS, 93000: NORMAL
P-R INTERVAL, ECG05: 156 MS
Q-T INTERVAL, ECG07: 430 MS
QRS DURATION, ECG06: 92 MS
QTC CALCULATION (BEZET), ECG08: 454 MS
VENTRICULAR RATE, ECG03: 67 BPM

## 2021-05-19 NOTE — OP NOTES
[facility] ED  OPERATIVE REPORT    Name:  Gab Martinez  MR#:  [id number]  :  1945  ACCOUNT #:  [de-identified]  DATE OF SERVICE:  2021    PREOPERATIVE DIAGNOSES:  1. Personal history of left breast cancer. 2.  Left breast radial scar. POSTOPERATIVE DIAGNOSES:  1. Personal history of left breast cancer. 2.  Left breast radial scar. PROCEDURE PERFORMED:  Left breast mastectomy, CPT code 99149. SURGEON:  Senthil Stuart DO    ASSISTANT:  None. ANESTHESIA:  General endotracheal.    COMPLICATIONS:  None. SPECIMENS REMOVED:  Left breast mastectomy, marked with a long lateral, short superior suture. IMPLANTS:  Per Dr. Joshua Collins. ESTIMATED BLOOD LOSS:  Minimal.    DISPOSITION:  Stable. COUNTS:  Sponge count, needle count, instrument count all correct x3. DESCRIPTION OF THE PROCEDURE:  This is a 61-year-old female with history of left breast cancer with partial mastectomy followed by radiation. She has developed a radial scar of the left breast and requires surgical excision. The patient has contracted left breast and wishes to pursue bilateral mastectomy due to her personal history with reconstruction by Dr. Joshua Collins. Today, we are planning a left breast mastectomy with tissue expander implants on the left side only. Consent was obtained by describing the procedure to the patient including potential complications to include infection, bleeding. Consent was obtained and placed in the final chart. She was administered Ancef 2 g IV preoperatively, taken to the operative suite and placed in the supine position. General anesthesia was initiated without complications. She was then prepped and draped in the usual sterile fashion. A time-out was taken to confirm the patient name and proper procedure. Following this, an elliptical incision was planned. A 0.25% Marcaine with Epinephrine was used to anesthetize the skin and subcutaneous tissue.   A #15 scalpel blade was used to make an elliptical incision based on Dr. Atif Francis markings. Using skin hooks, we dissected superiorly in the avascular plane to the clavicle superiorly, to the sternum medially, to the rectus muscle inferiorly, and to the serratus muscle laterally and then removed the breast from the pectoralis muscle in a medial to lateral orientation including the prepectoral fascia. The specimen was then marked with a long lateral, short superior suture and then sent to Pathology in formalin. At this point, we copiously irrigated with saline until clear, ensured hemostasis using spot cauterization and then concluded my portion of the case. Dr. Emmy Harding was present to begin the reconstruction process. Please see his notes for further details. FINDINGS:  A 40-year-old old female with history of breast cancer with radiation and new development of radial scar. Left breast mastectomy was performed without gross finding suggesting metastatic spread beyond the resection margins or palpable lymphadenopathy. Reconstruction was being performed by Dr. Emmy Harding to follow my portion of the case. She tolerated the procedure well.       [address], DO      [id number]  D:  05/18/2021 15:15  T:  05/18/2021 22:36  JOB #:  [id number]

## 2021-05-24 NOTE — OP NOTES
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 They proceeded with the ablative portion of the procedure. Following the conclusion of this procedure Plastic Surgery began the reconstructive portion. A separate sterile field was brought in and sterile drapes applied over the previous drapes. Laterally and medially the pec and serratus musculature was injected with 60 ml of 0.5% marcaine with epi. The surgical field was copiously irrigated with NS+ Baci. Hemostasis confirmed. The pectorialis major was elevated in continuity with the anterorior rectus fascia proceeding laterally at the lateral border and proceeding medially. Laterally serratus fascia was elevated. Base diameter measured and appropriately sized expander selected. The soft tissue envelope was not large enough to accommodate a single stage implant. Patient noted to have a fair amount of fibrosis consistent with her previous radiation and this the expander was placed empty. Suture tabs used to secure it medially and laterally with 3-0 vicryl. 10 flat VANESSA drains placed above and below the pec muscle, the posterior drain exited posteriorly and anterior anteriorly. Secured with 2-0 silk. Pec muscle sutured to serratus fascia to afford complete muscular coverage. Fat fascia closed with running 3-0 vicryll followed by deep dermal 3-0 vicryl and finally 3-0 monoderm quill at the skin. Dressed with xeroform and formal compressive breast dressing. Tolerated well.       Surgeon(s):  MD Arleen Lerner Gregory Serum, DO    Surgical Assistant: None    Anesthesia: General     Estimated Blood Loss (mL): 50 ml    Complications: None    Specimens:   ID Type Source Tests Collected by Time Destination   1 : Left Breast- Suture- Long Lateral/ Short Superior Preservative Breast  Trish Ocasio DO 5/18/2021 1429 Pathology        Implants:   Implant Name Type Inv.  Item Serial No.  Lot No. LRB No. Used Action   EXPNDR BRST TISS MED 350ML --  - [id number]  EXPNDR BRST TISS MED 350ML --   MENTOR [id number] Left 1 Implanted       Drains:   [REMOVED] Nathanael-Gudino Drain 05/18/21 Left Breast (Removed)   Site Assessment Clean, dry, & intact 05/18/21 1723   Dressing Status Clean, dry, & intact 05/18/21 1723   Status Charged;Draining 05/18/21 1723   Drainage Color Sanguinous 05/18/21 1723

## 2021-08-27 ENCOUNTER — HOSPITAL ENCOUNTER (OUTPATIENT)
Dept: LAB | Age: 76
Discharge: HOME | End: 2021-08-27
Payer: MEDICARE

## 2021-08-27 NOTE — PERIOP NOTES
Recent Results (from the past 12 hour(s))   CREATININE    Collection Time: 08/27/21 10:34 AM   Result Value Ref Range    Creatinine 0.69 0.6 - 1.0 MG/DL   POTASSIUM    Collection Time: 08/27/21 10:34 AM   Result Value Ref Range    Potassium 4.1 3.5 - 5.1 mmol/L   HEMOGLOBIN    Collection Time: 08/27/21 10:34 AM   Result Value Ref Range    HGB 13.1 11.7 - 15.4 g/dL   Reviewed

## 2021-08-30 ENCOUNTER — ANESTHESIA EVENT (OUTPATIENT)
Dept: SURGERY | Age: 76
End: 2021-08-30
Payer: MEDICARE

## 2021-08-31 ENCOUNTER — ANESTHESIA (OUTPATIENT)
Dept: SURGERY | Age: 76
End: 2021-08-31
Payer: MEDICARE

## 2021-08-31 ENCOUNTER — HOSPITAL ENCOUNTER (OUTPATIENT)
Age: 76
Setting detail: OUTPATIENT SURGERY
Discharge: HOME | End: 2021-08-31
Attending: SURGERY | Admitting: SURGERY
Payer: MEDICARE

## 2021-08-31 VITALS
BODY MASS INDEX: 36.96 KG/M2 | OXYGEN SATURATION: 91 % | HEART RATE: 70 BPM | TEMPERATURE: 98 F | HEIGHT: 66 IN | WEIGHT: 230 LBS | SYSTOLIC BLOOD PRESSURE: 109 MMHG | RESPIRATION RATE: 18 BRPM | DIASTOLIC BLOOD PRESSURE: 54 MMHG

## 2021-08-31 DIAGNOSIS — L76.82 PAIN AT SURGICAL INCISION: Primary | ICD-10-CM

## 2021-08-31 LAB
HBV SURFACE AG SER QL: NONREACTIVE
HCV AB SER QL: NONREACTIVE
HIV 1+2 AB+HIV1 P24 AG SERPL QL IA: NONREACTIVE
HIV12 RESULT COMMENT, HHIVC: ABNORMAL
POTASSIUM BLD-SCNC: 3.6 MMOL/L (ref 3.5–5.1)

## 2021-08-31 PROCEDURE — 74011250636 HC RX REV CODE- 250/636: Performed by: SURGERY

## 2021-08-31 PROCEDURE — 74011000250 HC RX REV CODE- 250: Performed by: SURGERY

## 2021-08-31 PROCEDURE — 77030003666 HC NDL SPINAL BD -A: Performed by: SURGERY

## 2021-08-31 PROCEDURE — 88307 TISSUE EXAM BY PATHOLOGIST: CPT

## 2021-08-31 PROCEDURE — 84132 ASSAY OF SERUM POTASSIUM: CPT

## 2021-08-31 PROCEDURE — 76210000063 HC OR PH I REC FIRST 0.5 HR: Performed by: SURGERY

## 2021-08-31 PROCEDURE — 77030037088 HC TUBE ENDOTRACH ORAL NSL COVD-A: Performed by: NURSE ANESTHETIST, CERTIFIED REGISTERED

## 2021-08-31 PROCEDURE — 77030002916 HC SUT ETHLN J&J -A: Performed by: SURGERY

## 2021-08-31 PROCEDURE — 77030031139 HC SUT VCRL2 J&J -A: Performed by: SURGERY

## 2021-08-31 PROCEDURE — 76060000038 HC ANESTHESIA 3.5 TO 4 HR: Performed by: SURGERY

## 2021-08-31 PROCEDURE — 76210000020 HC REC RM PH II FIRST 0.5 HR: Performed by: SURGERY

## 2021-08-31 PROCEDURE — 77030019908 HC STETH ESOPH SIMS -A: Performed by: ANESTHESIOLOGY

## 2021-08-31 PROCEDURE — 77030039425 HC BLD LARYNG TRULITE DISP TELE -A: Performed by: NURSE ANESTHETIST, CERTIFIED REGISTERED

## 2021-08-31 PROCEDURE — 74011250636 HC RX REV CODE- 250/636: Performed by: ANESTHESIOLOGY

## 2021-08-31 PROCEDURE — 77030008459 HC STPLR SKN COOP -B: Performed by: SURGERY

## 2021-08-31 PROCEDURE — 77030013629 HC ELECTRD NDL STRY -B: Performed by: SURGERY

## 2021-08-31 PROCEDURE — 2709999900 HC NON-CHARGEABLE SUPPLY: Performed by: SURGERY

## 2021-08-31 PROCEDURE — 74011250636 HC RX REV CODE- 250/636: Performed by: NURSE ANESTHETIST, CERTIFIED REGISTERED

## 2021-08-31 PROCEDURE — C1789 PROSTHESIS, BREAST, IMP: HCPCS | Performed by: SURGERY

## 2021-08-31 PROCEDURE — 74011000250 HC RX REV CODE- 250: Performed by: NURSE ANESTHETIST, CERTIFIED REGISTERED

## 2021-08-31 PROCEDURE — 77030033138 HC SUT PGA STRATFX J&J -B: Performed by: SURGERY

## 2021-08-31 PROCEDURE — 74011000272 HC RX REV CODE- 272: Performed by: SURGERY

## 2021-08-31 PROCEDURE — 77030040922 HC BLNKT HYPOTHRM STRY -A: Performed by: ANESTHESIOLOGY

## 2021-08-31 PROCEDURE — 74011250637 HC RX REV CODE- 250/637: Performed by: ANESTHESIOLOGY

## 2021-08-31 PROCEDURE — 77030008462 HC STPLR SKN PROX J&J -A: Performed by: SURGERY

## 2021-08-31 PROCEDURE — 77030040361 HC SLV COMPR DVT MDII -B: Performed by: SURGERY

## 2021-08-31 PROCEDURE — 76010000174 HC OR TIME 3.5 TO 4 HR INTENSV-TIER 1: Performed by: SURGERY

## 2021-08-31 DEVICE — SMOOTH ROUND HIGH PROFILE GEL-FILLED BREAST IMPLANT, 275CC  SMOOTH ROUND SILICONE
Type: IMPLANTABLE DEVICE | Site: BREAST | Status: FUNCTIONAL
Brand: MENTOR MEMORYGEL BREAST IMPLANT

## 2021-08-31 RX ORDER — NALOXONE HYDROCHLORIDE 0.4 MG/ML
0.1 INJECTION, SOLUTION INTRAMUSCULAR; INTRAVENOUS; SUBCUTANEOUS
Status: DISCONTINUED | OUTPATIENT
Start: 2021-08-31 | End: 2021-08-31 | Stop reason: HOSPADM

## 2021-08-31 RX ORDER — ROCURONIUM BROMIDE 10 MG/ML
INJECTION, SOLUTION INTRAVENOUS AS NEEDED
Status: DISCONTINUED | OUTPATIENT
Start: 2021-08-31 | End: 2021-08-31 | Stop reason: HOSPADM

## 2021-08-31 RX ORDER — LIDOCAINE HYDROCHLORIDE 10 MG/ML
0.1 INJECTION INFILTRATION; PERINEURAL AS NEEDED
Status: DISCONTINUED | OUTPATIENT
Start: 2021-08-31 | End: 2021-08-31 | Stop reason: HOSPADM

## 2021-08-31 RX ORDER — EPINEPHRINE 1 MG/ML
INJECTION, SOLUTION, CONCENTRATE INTRAVENOUS AS NEEDED
Status: DISCONTINUED | OUTPATIENT
Start: 2021-08-31 | End: 2021-08-31 | Stop reason: HOSPADM

## 2021-08-31 RX ORDER — OXYCODONE HYDROCHLORIDE 5 MG/1
10 TABLET ORAL
Status: DISCONTINUED | OUTPATIENT
Start: 2021-08-31 | End: 2021-08-31 | Stop reason: HOSPADM

## 2021-08-31 RX ORDER — HYDROMORPHONE HYDROCHLORIDE 2 MG/ML
0.5 INJECTION, SOLUTION INTRAMUSCULAR; INTRAVENOUS; SUBCUTANEOUS
Status: DISCONTINUED | OUTPATIENT
Start: 2021-08-31 | End: 2021-08-31 | Stop reason: HOSPADM

## 2021-08-31 RX ORDER — ACETAMINOPHEN 500 MG
1000 TABLET ORAL ONCE
Status: COMPLETED | OUTPATIENT
Start: 2021-08-31 | End: 2021-08-31

## 2021-08-31 RX ORDER — NEOSTIGMINE METHYLSULFATE 1 MG/ML
INJECTION, SOLUTION INTRAVENOUS AS NEEDED
Status: DISCONTINUED | OUTPATIENT
Start: 2021-08-31 | End: 2021-08-31 | Stop reason: HOSPADM

## 2021-08-31 RX ORDER — LIDOCAINE HYDROCHLORIDE 20 MG/ML
INJECTION, SOLUTION EPIDURAL; INFILTRATION; INTRACAUDAL; PERINEURAL AS NEEDED
Status: DISCONTINUED | OUTPATIENT
Start: 2021-08-31 | End: 2021-08-31 | Stop reason: HOSPADM

## 2021-08-31 RX ORDER — GLYCOPYRROLATE 0.2 MG/ML
INJECTION INTRAMUSCULAR; INTRAVENOUS AS NEEDED
Status: DISCONTINUED | OUTPATIENT
Start: 2021-08-31 | End: 2021-08-31 | Stop reason: HOSPADM

## 2021-08-31 RX ORDER — DIPHENHYDRAMINE HYDROCHLORIDE 50 MG/ML
12.5 INJECTION, SOLUTION INTRAMUSCULAR; INTRAVENOUS
Status: DISCONTINUED | OUTPATIENT
Start: 2021-08-31 | End: 2021-08-31 | Stop reason: HOSPADM

## 2021-08-31 RX ORDER — APREPITANT 40 MG/1
40 CAPSULE ORAL ONCE
Status: COMPLETED | OUTPATIENT
Start: 2021-08-31 | End: 2021-08-31

## 2021-08-31 RX ORDER — PROPOFOL 10 MG/ML
INJECTION, EMULSION INTRAVENOUS AS NEEDED
Status: DISCONTINUED | OUTPATIENT
Start: 2021-08-31 | End: 2021-08-31 | Stop reason: HOSPADM

## 2021-08-31 RX ORDER — FENTANYL CITRATE 50 UG/ML
INJECTION, SOLUTION INTRAMUSCULAR; INTRAVENOUS AS NEEDED
Status: DISCONTINUED | OUTPATIENT
Start: 2021-08-31 | End: 2021-08-31 | Stop reason: HOSPADM

## 2021-08-31 RX ORDER — SODIUM CHLORIDE, SODIUM LACTATE, POTASSIUM CHLORIDE, CALCIUM CHLORIDE 600; 310; 30; 20 MG/100ML; MG/100ML; MG/100ML; MG/100ML
75 INJECTION, SOLUTION INTRAVENOUS CONTINUOUS
Status: DISCONTINUED | OUTPATIENT
Start: 2021-08-31 | End: 2021-08-31 | Stop reason: HOSPADM

## 2021-08-31 RX ORDER — CLINDAMYCIN PHOSPHATE 600 MG/50ML
600 INJECTION INTRAVENOUS ONCE
Status: DISCONTINUED | OUTPATIENT
Start: 2021-08-31 | End: 2021-08-31 | Stop reason: DRUGHIGH

## 2021-08-31 RX ORDER — LIDOCAINE HYDROCHLORIDE 10 MG/ML
INJECTION INFILTRATION; PERINEURAL AS NEEDED
Status: DISCONTINUED | OUTPATIENT
Start: 2021-08-31 | End: 2021-08-31 | Stop reason: HOSPADM

## 2021-08-31 RX ORDER — CLINDAMYCIN PHOSPHATE 900 MG/50ML
900 INJECTION INTRAVENOUS ONCE
Status: COMPLETED | OUTPATIENT
Start: 2021-08-31 | End: 2021-08-31

## 2021-08-31 RX ORDER — SODIUM CHLORIDE 0.9 G/100ML
IRRIGANT IRRIGATION AS NEEDED
Status: DISCONTINUED | OUTPATIENT
Start: 2021-08-31 | End: 2021-08-31 | Stop reason: HOSPADM

## 2021-08-31 RX ORDER — OXYCODONE HYDROCHLORIDE 5 MG/1
5 TABLET ORAL
Status: DISCONTINUED | OUTPATIENT
Start: 2021-08-31 | End: 2021-08-31 | Stop reason: HOSPADM

## 2021-08-31 RX ORDER — FLUMAZENIL 0.1 MG/ML
0.2 INJECTION INTRAVENOUS AS NEEDED
Status: DISCONTINUED | OUTPATIENT
Start: 2021-08-31 | End: 2021-08-31 | Stop reason: HOSPADM

## 2021-08-31 RX ORDER — EPHEDRINE SULFATE/0.9% NACL/PF 50 MG/5 ML
SYRINGE (ML) INTRAVENOUS AS NEEDED
Status: DISCONTINUED | OUTPATIENT
Start: 2021-08-31 | End: 2021-08-31 | Stop reason: HOSPADM

## 2021-08-31 RX ORDER — HYDROCODONE BITARTRATE AND ACETAMINOPHEN 5; 325 MG/1; MG/1
1 TABLET ORAL
Qty: 30 TABLET | Refills: 0 | Status: SHIPPED | OUTPATIENT
Start: 2021-08-31 | End: 2021-09-03

## 2021-08-31 RX ORDER — DEXAMETHASONE SODIUM PHOSPHATE 4 MG/ML
INJECTION, SOLUTION INTRA-ARTICULAR; INTRALESIONAL; INTRAMUSCULAR; INTRAVENOUS; SOFT TISSUE AS NEEDED
Status: DISCONTINUED | OUTPATIENT
Start: 2021-08-31 | End: 2021-08-31 | Stop reason: HOSPADM

## 2021-08-31 RX ORDER — ONDANSETRON 2 MG/ML
INJECTION INTRAMUSCULAR; INTRAVENOUS AS NEEDED
Status: DISCONTINUED | OUTPATIENT
Start: 2021-08-31 | End: 2021-08-31 | Stop reason: HOSPADM

## 2021-08-31 RX ADMIN — Medication 10 MG: at 10:23

## 2021-08-31 RX ADMIN — ROCURONIUM BROMIDE 5 MG: 10 INJECTION, SOLUTION INTRAVENOUS at 09:56

## 2021-08-31 RX ADMIN — PHENYLEPHRINE HYDROCHLORIDE 120 MCG: 10 INJECTION INTRAVENOUS at 10:23

## 2021-08-31 RX ADMIN — Medication 5 MG: at 10:49

## 2021-08-31 RX ADMIN — FENTANYL CITRATE 25 MCG: 50 INJECTION INTRAMUSCULAR; INTRAVENOUS at 10:39

## 2021-08-31 RX ADMIN — ROCURONIUM BROMIDE 10 MG: 10 INJECTION, SOLUTION INTRAVENOUS at 08:38

## 2021-08-31 RX ADMIN — Medication 10 MG: at 07:55

## 2021-08-31 RX ADMIN — FENTANYL CITRATE 25 MCG: 50 INJECTION INTRAMUSCULAR; INTRAVENOUS at 10:48

## 2021-08-31 RX ADMIN — FENTANYL CITRATE 25 MCG: 50 INJECTION INTRAMUSCULAR; INTRAVENOUS at 11:24

## 2021-08-31 RX ADMIN — PROPOFOL 150 MG: 10 INJECTION, EMULSION INTRAVENOUS at 07:35

## 2021-08-31 RX ADMIN — SODIUM CHLORIDE, SODIUM LACTATE, POTASSIUM CHLORIDE, AND CALCIUM CHLORIDE: 600; 310; 30; 20 INJECTION, SOLUTION INTRAVENOUS at 09:45

## 2021-08-31 RX ADMIN — DEXAMETHASONE SODIUM PHOSPHATE 4 MG: 4 INJECTION, SOLUTION INTRAMUSCULAR; INTRAVENOUS at 07:50

## 2021-08-31 RX ADMIN — LIDOCAINE HYDROCHLORIDE 50 MG: 20 INJECTION, SOLUTION EPIDURAL; INFILTRATION; INTRACAUDAL; PERINEURAL at 07:35

## 2021-08-31 RX ADMIN — GLYCOPYRROLATE 0.7 MG: 0.2 INJECTION, SOLUTION INTRAMUSCULAR; INTRAVENOUS at 10:49

## 2021-08-31 RX ADMIN — Medication 10 MG: at 08:00

## 2021-08-31 RX ADMIN — APREPITANT 40 MG: 40 CAPSULE ORAL at 06:20

## 2021-08-31 RX ADMIN — Medication 10 MG: at 10:12

## 2021-08-31 RX ADMIN — PROPOFOL 30 MG: 10 INJECTION, EMULSION INTRAVENOUS at 09:56

## 2021-08-31 RX ADMIN — FENTANYL CITRATE 100 MCG: 50 INJECTION INTRAMUSCULAR; INTRAVENOUS at 07:35

## 2021-08-31 RX ADMIN — PHENYLEPHRINE HYDROCHLORIDE 120 MCG: 10 INJECTION INTRAVENOUS at 09:00

## 2021-08-31 RX ADMIN — PHENYLEPHRINE HYDROCHLORIDE 120 MCG: 10 INJECTION INTRAVENOUS at 08:10

## 2021-08-31 RX ADMIN — ROCURONIUM BROMIDE 50 MG: 10 INJECTION, SOLUTION INTRAVENOUS at 07:35

## 2021-08-31 RX ADMIN — FENTANYL CITRATE 25 MCG: 50 INJECTION INTRAMUSCULAR; INTRAVENOUS at 11:15

## 2021-08-31 RX ADMIN — ACETAMINOPHEN 1000 MG: 500 TABLET ORAL at 06:20

## 2021-08-31 RX ADMIN — PROPOFOL 25 MCG/KG/MIN: 10 INJECTION, EMULSION INTRAVENOUS at 07:51

## 2021-08-31 RX ADMIN — SODIUM CHLORIDE, SODIUM LACTATE, POTASSIUM CHLORIDE, AND CALCIUM CHLORIDE 75 ML/HR: 600; 310; 30; 20 INJECTION, SOLUTION INTRAVENOUS at 06:00

## 2021-08-31 RX ADMIN — Medication 10 MG: at 09:00

## 2021-08-31 RX ADMIN — Medication 10 MG: at 08:10

## 2021-08-31 RX ADMIN — PHENYLEPHRINE HYDROCHLORIDE 60 MCG: 10 INJECTION INTRAVENOUS at 08:00

## 2021-08-31 RX ADMIN — CLINDAMYCIN PHOSPHATE 900 MG: 900 INJECTION, SOLUTION INTRAVENOUS at 07:50

## 2021-08-31 RX ADMIN — PHENYLEPHRINE HYDROCHLORIDE 120 MCG: 10 INJECTION INTRAVENOUS at 10:11

## 2021-08-31 RX ADMIN — ONDANSETRON 4 MG: 2 INJECTION INTRAMUSCULAR; INTRAVENOUS at 07:50

## 2021-08-31 NOTE — DISCHARGE INSTRUCTIONS
Avoid lifting more than 5 lbs  Keep arms below level of shoulders. Keep dressings clean and dry. MEDICATION INTERACTION:    During your procedure you potentially received a medication or medications which may reduce the effectiveness of oral contraceptives. Please consider other forms of contraception for 1 month following your procedure if you are currently using oral contraceptives as your primary form of birth control. In addition to this, we recommend continuing your oral contraceptive as prescribed, unless otherwise instructed by your physician, during this time. ACTIVITY  · As tolerated and as directed by your doctor. · You may shower in 24 hours. Do not take a bath until cleared by MD.     DIET  · Clear liquids until no nausea or vomiting, then light diet for the first day. · Advance to regular diet on second day, unless your doctor orders otherwise. · If nausea and vomiting continues, call your doctor. PAIN  · Take pain medication as directed by your doctor. · Call your doctor if pain is NOT relieved by medication. CALL YOUR DOCTOR IF   · Excessive bleeding that does not stop after holding pressure over the area. · Temperature of 101 degrees F or above. · Excessive redness, swelling or bruising, and/or green or yellow, smelly discharge from incision. After general anesthesia or intravenous sedation, for 24 hours or while taking prescription Narcotics:  · Limit your activities  · A responsible adult needs to be with you for the next 24 hours  · Do not drive and operate hazardous machinery  · Do not make important personal or business decisions  · Do not drink alcoholic beverages  · If you have not urinated within 8 hours after discharge, and you are experiencing discomfort from urinary retention, please go to the nearest ED. · If you have sleep apnea and have a CPAP machine, please use it for all naps and sleeping.   · Please use caution when taking narcotics and any of your home medications that may cause drowsiness. *  Please give a list of your current medications to your Primary Care Provider. *  Please update this list whenever your medications are discontinued, doses are      changed, or new medications (including over-the-counter products) are added. *  Please carry medication information at all times in case of emergency situations. These are general instructions for a healthy lifestyle:  No smoking/ No tobacco products/ Avoid exposure to second hand smoke  Surgeon General's Warning:  Quitting smoking now greatly reduces serious risk to your health. Obesity, smoking, and sedentary lifestyle greatly increases your risk for illness  A healthy diet, regular physical exercise & weight monitoring are important for maintaining a healthy lifestyle    You may be retaining fluid if you have a history of heart failure or if you experience any of the following symptoms:  Weight gain of 3 pounds or more overnight or 5 pounds in a week, increased swelling in our hands or feet or shortness of breath while lying flat in bed. Please call your doctor as soon as you notice any of these symptoms; do not wait until your next office visit.

## 2021-08-31 NOTE — PERIOP NOTES
Debrief completed Yes  Correct procedure Yes    Count completed and verified Yes    Specimen collected and verified Yes    Wound classification Yes    Other NA

## 2021-08-31 NOTE — ANESTHESIA POSTPROCEDURE EVALUATION
Procedure(s):  LEFT BREAST TISSUE EXPANDER IMPLANT EXCHANGE (FROM EXPANDER TO IMPLANT)  RIGHT BREAST REDUCTION WITH MASTOPEXY. general    Anesthesia Post Evaluation      Multimodal analgesia: multimodal analgesia used between 6 hours prior to anesthesia start to PACU discharge  Patient location during evaluation: PACU  Patient participation: complete - patient participated  Level of consciousness: awake  Pain management: adequate  Airway patency: patent  Anesthetic complications: no  Cardiovascular status: acceptable and hemodynamically stable  Respiratory status: acceptable  Hydration status: acceptable  Comments: Acceptable for discharge from PACU. Post anesthesia nausea and vomiting:  none  Final Post Anesthesia Temperature Assessment:  Normothermia (36.0-37.5 degrees C)      INITIAL Post-op Vital signs:   Vitals Value Taken Time   /54 08/31/21 1154   Temp 36.8 °C (98.2 °F) 08/31/21 1127   Pulse 66 08/31/21 1157   Resp 16 08/31/21 1149   SpO2 89 % 08/31/21 1157   Vitals shown include unvalidated device data.

## 2021-08-31 NOTE — H&P
CC: Left breast absence     HPI: 65 yo s/p left breast cancer and mastectomy.      Past Medical History:   Diagnosis Date    Abnormal serum cholesterol     Allergic rhinitis     Arthritis     bilat hands & toes    Asthma     inhaler daily and prn    Breast cancer ([address] 2021    mastectomy- left     Breast cancer, left ([address] 1998    lumpectomy left breast 1998 and radiation    Chronic knee pain 10/21/2016    Chronic sinusitis     COVID-19 vaccine series completed     pfizer, second does 2/2021- will bring card    Degenerative cervical spinal stenosis     Depression     depression - pt takes zoloft    Deviated nasal septum     Difficult intubation     Grade 3 view on DL - glidescope used successfully     Disturbance of salivary secretion     GERD (gastroesophageal reflux disease)     nexium twice daily, and 2 pillows    Hoarseness     Hypercholesterolemia     controlled with medication    Hyperlipidemia     Hypertension     controlled with medication    Hypokalemia 10/21/2016    Hypothyroidism     Multilevel degenerative disc disease     Musculoskeletal pain     PONV (postoperative nausea and vomiting)     Postmenopausal bleeding     Prolapse of female genital organs     Sinus problem     SNHL (sensorineural hearing loss)     Thyroid disease     hypo - controlled with medication    Thyroid nodule     Unspecified sleep apnea     pt wears CPAP at night    Uterine prolapse      Past Surgical History:   Procedure Laterality Date    HX APPENDECTOMY      HX BREAST LUMPECTOMY Left     left lumpectomy and radiation    HX BREAST RECONSTRUCTION Left 5/18/2021    LEFT BREAST RECONSTRUCTION performed by Joanie Reeder MD at [address] [facility] Left 5/18/2021    LEFT BREAST TISSUE EXPANDER INSERTION performed by Joanie Reeder MD at [address] HX CATARACT REMOVAL Bilateral 2021    HX HEENT      palatal surgery    HX HIP REPLACEMENT Left     HX HYSTERECTOMY      HX KNEE REPLACEMENT Right 7/8/14    TKR    HX KNEE REPLACEMENT Left     TKR    HX MASTECTOMY Left 5/18/2021    LEFT BREAST MASTECTOMY PREOP 11:30/ SURGERY 1:00 performed by Ayana Rosario, DO at [address] HX ORTHOPAEDIC      finger growths     HX ORTHOPAEDIC      left thumb    HX ORTHOPAEDIC      left foot bunion    HX ORTHOPAEDIC Right 4/2016    Toes on right foot    HX SEPTOPLASTY  7/8/04    Septo/ FESS    HX SHOULDER REPLACEMENT Left     HX TONSILLECTOMY      HX UVULOPALATOPHARYNGOPLASTY      ND CYSTOTOMY,EXCIS BLADDER TUMOR      cysto X 8- last one 2016    ND SINUS SURGERY PROC UNLISTED Right 9/29/11    Balloon frontal sinuplasty/ irrigation and culture max sinus     Visit Vitals  BP (!) 182/77 (BP 1 Location: Right arm, BP Patient Position: Sitting)   Pulse 92   Temp 99.1 °F (37.3 °C)   Resp 18   Ht 5' 5.5\" (1.664 m)   Wt 104.3 kg (230 lb)   SpO2 97%   BMI 37.69 kg/m²     A&O x3  RRR  Resp clear  Right side with gr 3 ptosis. Left expander in place. A/P:  Will proceed with right breast reduction and left expander implant exchange.

## 2021-08-31 NOTE — ANESTHESIA PREPROCEDURE EVALUATION
Relevant Problems   RESPIRATORY SYSTEM   (+) Asthma      NEUROLOGY   (+) Depression      CARDIOVASCULAR   (+) Hypertension      GASTROINTESTINAL   (+) GERD (gastroesophageal reflux disease)      ENDOCRINE   (+) Hypothyroidism   (+) Severe obesity (BMI 35.0-39. 9) with comorbidity (HCC)       Anesthetic History     Increased risk of difficult airway (Grade 3 DL- successful glidescope use) and PONV          Review of Systems / Medical History  Patient summary reviewed, nursing notes reviewed and pertinent labs reviewed    Pulmonary        Sleep apnea: CPAP    Asthma : well controlled       Neuro/Psych         Psychiatric history     Cardiovascular    Hypertension: well controlled          Hyperlipidemia    Exercise tolerance: >4 METS     GI/Hepatic/Renal     GERD: well controlled           Endo/Other      Hypothyroidism: well controlled  Obesity, arthritis and cancer     Other Findings            Physical Exam    Airway  Mallampati: III      Mouth opening: Normal     Cardiovascular  Regular rate and rhythm,  S1 and S2 normal,  no murmur, click, rub, or gallop             Dental  No notable dental hx       Pulmonary  Breath sounds clear to auscultation               Abdominal         Other Findings            Anesthetic Plan    ASA: 3  Anesthesia type: general          Induction: Intravenous  Anesthetic plan and risks discussed with: Patient      Glidescope utilized 5/21

## 2022-02-07 ENCOUNTER — ANESTHESIA EVENT (OUTPATIENT)
Dept: SURGERY | Age: 77
End: 2022-02-07

## 2022-02-07 RX ORDER — SODIUM CHLORIDE 0.9 % (FLUSH) 0.9 %
5-40 SYRINGE (ML) INJECTION EVERY 8 HOURS
Status: CANCELLED | OUTPATIENT
Start: 2022-02-07

## 2022-02-07 RX ORDER — OXYCODONE AND ACETAMINOPHEN 5; 325 MG/1; MG/1
1 TABLET ORAL AS NEEDED
Status: CANCELLED | OUTPATIENT
Start: 2022-02-07

## 2022-02-07 RX ORDER — SODIUM CHLORIDE, SODIUM LACTATE, POTASSIUM CHLORIDE, CALCIUM CHLORIDE 600; 310; 30; 20 MG/100ML; MG/100ML; MG/100ML; MG/100ML
100 INJECTION, SOLUTION INTRAVENOUS CONTINUOUS
Status: CANCELLED | OUTPATIENT
Start: 2022-02-07

## 2022-02-07 RX ORDER — SODIUM CHLORIDE 0.9 % (FLUSH) 0.9 %
5-40 SYRINGE (ML) INJECTION AS NEEDED
Status: CANCELLED | OUTPATIENT
Start: 2022-02-07

## 2022-02-07 RX ORDER — HYDROMORPHONE HYDROCHLORIDE 2 MG/ML
0.5 INJECTION, SOLUTION INTRAMUSCULAR; INTRAVENOUS; SUBCUTANEOUS
Status: CANCELLED | OUTPATIENT
Start: 2022-02-07

## 2022-02-07 RX ORDER — OXYCODONE HYDROCHLORIDE 5 MG/1
5 TABLET ORAL
Status: CANCELLED | OUTPATIENT
Start: 2022-02-07 | End: 2022-02-08

## 2022-02-08 ENCOUNTER — APPOINTMENT (OUTPATIENT)
Dept: GENERAL RADIOLOGY | Age: 77
End: 2022-02-08
Attending: EMERGENCY MEDICINE
Payer: MEDICARE

## 2022-02-08 ENCOUNTER — HOSPITAL ENCOUNTER (OUTPATIENT)
Age: 77
Setting detail: OUTPATIENT SURGERY
Discharge: HOME | End: 2022-02-08
Attending: SURGERY | Admitting: SURGERY

## 2022-02-08 ENCOUNTER — ANESTHESIA (OUTPATIENT)
Dept: SURGERY | Age: 77
End: 2022-02-08

## 2022-02-08 ENCOUNTER — HOSPITAL ENCOUNTER (EMERGENCY)
Age: 77
Discharge: HOME | End: 2022-02-08
Attending: EMERGENCY MEDICINE
Payer: MEDICARE

## 2022-02-08 VITALS
BODY MASS INDEX: 35.68 KG/M2 | DIASTOLIC BLOOD PRESSURE: 63 MMHG | TEMPERATURE: 98.5 F | HEIGHT: 66 IN | RESPIRATION RATE: 18 BRPM | SYSTOLIC BLOOD PRESSURE: 148 MMHG | HEART RATE: 69 BPM | WEIGHT: 222 LBS | OXYGEN SATURATION: 97 %

## 2022-02-08 VITALS
WEIGHT: 229 LBS | HEART RATE: 89 BPM | TEMPERATURE: 98.9 F | DIASTOLIC BLOOD PRESSURE: 94 MMHG | BODY MASS INDEX: 37.53 KG/M2 | RESPIRATION RATE: 18 BRPM | OXYGEN SATURATION: 98 % | SYSTOLIC BLOOD PRESSURE: 168 MMHG

## 2022-02-08 DIAGNOSIS — S82.841A CLOSED BIMALLEOLAR FRACTURE OF RIGHT ANKLE, INITIAL ENCOUNTER: Primary | ICD-10-CM

## 2022-02-08 PROBLEM — F11.99 OPIOID USE, UNSPECIFIED WITH UNSPECIFIED OPIOID-INDUCED DISORDER (HCC): Status: ACTIVE | Noted: 2022-02-08

## 2022-02-08 PROCEDURE — 73590 X-RAY EXAM OF LOWER LEG: CPT

## 2022-02-08 PROCEDURE — 75810000053 HC SPLINT APPLICATION

## 2022-02-08 PROCEDURE — 99284 EMERGENCY DEPT VISIT MOD MDM: CPT

## 2022-02-08 PROCEDURE — 73610 X-RAY EXAM OF ANKLE: CPT

## 2022-02-08 RX ORDER — HYDROCODONE BITARTRATE AND ACETAMINOPHEN 7.5; 325 MG/1; MG/1
1 TABLET ORAL
Qty: 15 TABLET | Refills: 0 | Status: SHIPPED | OUTPATIENT
Start: 2022-02-08 | End: 2022-02-13

## 2022-02-08 NOTE — PERIOP NOTES
Patient reports a fall this am  And pain with small lac to ankle. Dr. Luisana Polanco called and instructed patient to go to ER to rule out fracture before surgery can take place.   PT taken via wheelchair with  at side

## 2022-02-08 NOTE — ED TRIAGE NOTES
Arrives with face mask in place. Arrives via wheelchair with assistance from staff. Pt brought over from pre-op. Pt scheduled for left breast reconstruction today with dr Madelin Champion however fell while coming into hospital. Swelling, wound to right ankle. States missed step causing fall. Foot bent under her with fall. Denies hitting head or loss of consciousness. Denies blood thinners.

## 2022-02-08 NOTE — ED NOTES
Splint applied by Sean Grover RN and crutches provided with discharge instructions. Patient given the number to call [facility] for follow up today.

## 2022-02-08 NOTE — ED PROVIDER NOTES
Patient is here for reconstructive breast surgery with Dr. Navid Garcia today. On her way to the hospital she slipped down some stairs and injured her right ankle. Has bruising and swelling. Sent here from preop for further evaluation    The history is provided by the patient. No  was used. Fall  The accident occurred 1 to 2 hours ago. The fall occurred while walking. She fell from a height of ground level. She landed on hard floor. There was no blood loss. Pain location: right ankle. The pain is moderate. Pertinent negatives include no visual change, no fever, no numbness, no abdominal pain, no bowel incontinence, no nausea, no vomiting, no headaches, no loss of consciousness, no tingling and no laceration. The risk factors include being elderly. The symptoms are aggravated by pressure on injury and use of injured limb. She has tried nothing for the symptoms.         Past Medical History:   Diagnosis Date    Abnormal serum cholesterol     Allergic rhinitis     Arthritis     bilat hands & toes    Asthma     inhaler daily and prn    Breast cancer ([address] 2021    mastectomy- left     Breast cancer, left ([address] 1998    lumpectomy left breast 1998 and radiation    Chronic knee pain 10/21/2016    Chronic sinusitis     COVID-19 vaccine series completed     pfizer, second does 2/2021- will bring card    Degenerative cervical spinal stenosis     Depression     depression - pt takes zoloft    Deviated nasal septum     Difficult intubation     Grade 3 view on DL - glidescope used successfully     Disturbance of salivary secretion     GERD (gastroesophageal reflux disease)     nexium twice daily, and 2 pillows    Hoarseness     Hypercholesterolemia     controlled with medication    Hyperlipidemia     Hypertension     controlled with medication    Hypokalemia 10/21/2016    Hypothyroidism     Multilevel degenerative disc disease     Musculoskeletal pain     PONV (postoperative nausea and vomiting)     Postmenopausal bleeding     Prolapse of female genital organs     Sinus problem     SNHL (sensorineural hearing loss)     Thyroid disease     hypo - controlled with medication    Thyroid nodule     Unspecified sleep apnea     pt wears CPAP at night    Uterine prolapse        Past Surgical History:   Procedure Laterality Date    HX APPENDECTOMY      HX BREAST LUMPECTOMY Left     left lumpectomy and radiation    HX BREAST RECONSTRUCTION Left 5/18/2021    LEFT BREAST RECONSTRUCTION performed by Kayla Guy MD at [address] [facility] Left 5/18/2021    LEFT BREAST TISSUE EXPANDER INSERTION performed by Kayla Guy MD at [address] [facility] Left 8/31/2021    LEFT BREAST TISSUE EXPANDER IMPLANT EXCHANGE (FROM EXPANDER TO IMPLANT) performed by Kayla Guy MD at [address] [place] [address] Bilateral 2021    HX HEENT      palatal surgery    HX HIP REPLACEMENT Left     HX HYSTERECTOMY      HX KNEE REPLACEMENT Right 7/8/14    TKR    HX KNEE REPLACEMENT Left     TKR    HX MASTECTOMY Left 5/18/2021    LEFT BREAST MASTECTOMY PREOP 11:30/ SURGERY 1:00 performed by Lynne Pallas, DO at [address] HX ORTHOPAEDIC      finger growths     HX ORTHOPAEDIC      left thumb    HX ORTHOPAEDIC      left foot bunion    HX ORTHOPAEDIC Right 4/2016    Toes on right foot    HX SEPTOPLASTY  7/8/04    Septo/ FESS    HX SHOULDER REPLACEMENT Left     HX TONSILLECTOMY      HX UVULOPALATOPHARYNGOPLASTY      GA CYSTOTOMY,EXCIS BLADDER TUMOR      cysto X 8- last one 2016    GA SINUS SURGERY PROC UNLISTED Right 9/29/11    Balloon frontal sinuplasty/ irrigation and culture max sinus         Family History:   Problem Relation Age of Onset    Hypertension Mother     Stroke Mother     Hypertension Sister     Hypertension Other     Elevated Lipids Other        Social History     Socioeconomic History    Marital status:      Spouse name: Not on file    Number of children: Not on file    Years of education: Not on file    Highest education level: Not on file   Occupational History    Not on file   Tobacco Use    Smoking status: Never Smoker    Smokeless tobacco: Never Used   Vaping Use    Vaping Use: Never used   Substance and Sexual Activity    Alcohol use: Not Currently    Drug use: No    Sexual activity: Yes     Partners: Male     Birth control/protection: None   Other Topics Concern    Not on file   Social History Narrative    Not on file     Social Determinants of Health     Financial Resource Strain:     Difficulty of Paying Living Expenses: Not on file   Food Insecurity:     Worried About Running Out of Food in the Last Year: Not on file    Lamine of Food in the Last Year: Not on file   Transportation Needs:     Lack of Transportation (Medical): Not on file    Lack of Transportation (Non-Medical): Not on file   Physical Activity:     Days of Exercise per Week: Not on file    Minutes of Exercise per Session: Not on file   Stress:     Feeling of Stress : Not on file   Social Connections:     Frequency of Communication with Friends and Family: Not on file    Frequency of Social Gatherings with Friends and Family: Not on file    Attends Congregation Services: Not on file    Active Member of [address] [place] [address] or Organizations: Not on file    Attends Club or Organization Meetings: Not on file    Marital Status: Not on file   Intimate Partner Violence:     Fear of Current or Ex-Partner: Not on file    Emotionally Abused: Not on file    Physically Abused: Not on file    Sexually Abused: Not on file   Housing Stability:     Unable to Pay for Housing in the Last Year: Not on file    Number of [place] in the Last Year: Not on file    Unstable Housing in the Last Year: Not on file         ALLERGIES: Cefaclor; Cefdinir; Cefuroxime axetil; Clarithromycin; Fluconazole; Moxifloxacin; Penicillins;  Shellfish containing products; Ciprocinonide; Other medication; Other plant, animal, environmental; and Cephalexin    Review of Systems   Constitutional: Negative for chills and fever. Eyes: Negative for pain and redness. Respiratory: Negative for chest tightness, shortness of breath and wheezing. Cardiovascular: Negative for chest pain and leg swelling. Gastrointestinal: Negative for abdominal pain, bowel incontinence, diarrhea, nausea and vomiting. Musculoskeletal: Positive for gait problem and joint swelling. Negative for back pain, neck pain and neck stiffness. Skin: Positive for wound. Negative for color change and rash. Neurological: Negative for tingling, loss of consciousness, weakness, numbness and headaches. Vitals:    02/08/22 0651   BP: (!) 126/54   Pulse: 69   Resp: 18   Temp: 98.5 °F (36.9 °C)   SpO2: 98%   Weight: 100.7 kg (222 lb)   Height: 5' 5.5\" (1.664 m)            Physical Exam  Constitutional:       Appearance: Normal appearance. She is well-developed. HENT:      Head: Normocephalic and atraumatic. Cardiovascular:      Rate and Rhythm: Normal rate and regular rhythm. Pulmonary:      Effort: Pulmonary effort is normal.      Breath sounds: Normal breath sounds. Abdominal:      General: Bowel sounds are normal.      Palpations: Abdomen is soft. Tenderness: There is no abdominal tenderness. Musculoskeletal:         General: Swelling and tenderness (abrasions to right ankle with swelling and ecchymosis. ) present. Skin:     General: Skin is warm and dry. Findings: No laceration. Neurological:      Mental Status: She is alert and oriented to person, place, and time. MDM  Number of Diagnoses or Management Options  Diagnosis management comments: Patient with bimalleolar fracture. Ortho notified for follow-up. Patient given crutches and pain medicine and will discharge.        Amount and/or Complexity of Data Reviewed  Tests in the radiology section of CPT®: ordered and reviewed    Patient Progress  Patient progress: stable         Procedures             XR TIB/FIB RT (Final result)  Result time 02/08/22 08:56:00  Final result by Scotty Salazar DO (02/08/22 08:56:00)                Impression:    No additional acute fracture identified. Narrative:    Right tibia-fibula radiographs     HISTORY: Ankle fracture, fall     AP and lateral views were obtained. Comparison: None     FINDINGS: Again noted is the comminuted and mildly displaced fracture of the   distal fibula. Additional small calcific densities are present adjacent to the   medial malleolus No additional acute fracture or dislocation is present. A total   knee arthroplasty is noted. There is soft tissue swelling distally. There is no   bony destruction.                         XR ANKLE RT MIN 3 V (Final result)  Result time 02/08/22 08:28:31  Final result by Rehan Juan MD (02/08/22 08:28:31)                Impression:      Bimalleolar fractures as described with disruption and widening of the ankle   mortise and syndesmosis. Narrative:    EXAMINATION: XR ANKLE RT MIN 3 V 2/8/2022 8:08 AM     COMPARISON: None available.       INDICATION: fall     TECHNIQUE: 3 views of the right ankle were obtained     FINDINGS:     Acute displaced oblique fracture of the lateral malleolus with extension into   the distal tibiofibular syndesmosis and ankle mortise. There are acute displaced   avulsion fracture from the tip of the medial malleolus with widening of the   medial ankle gutter and syndesmosis. There is no definitive posterior malleolar   fracture. Attention on post reduction films. There is a tibiotalar joint   effusion and diffuse subcutaneous edema.  Osteopenia.

## 2022-02-08 NOTE — ED NOTES
I have reviewed discharge instructions with the patient. The patient verbalized understanding. Patient left ED via Discharge Method: ambulatory to Home with family. Opportunity for questions and clarification provided. Patient given 1 scripts. To continue your aftercare when you leave the hospital, you may receive an automated call from our care team to check in on how you are doing. This is a free service and part of our promise to provide the best care and service to meet your aftercare needs.  If you have questions, or wish to unsubscribe from this service please call [phone]. Thank you for Choosing our [facility] Emergency Department.

## 2022-02-15 ENCOUNTER — ANESTHESIA EVENT (OUTPATIENT)
Dept: SURGERY | Age: 77
End: 2022-02-15
Payer: MEDICARE

## 2022-02-16 ENCOUNTER — ANESTHESIA (OUTPATIENT)
Dept: SURGERY | Age: 77
End: 2022-02-16
Payer: MEDICARE

## 2022-02-16 ENCOUNTER — HOSPITAL ENCOUNTER (OUTPATIENT)
Age: 77
Setting detail: OUTPATIENT SURGERY
Discharge: HOME | End: 2022-02-16
Attending: ORTHOPAEDIC SURGERY | Admitting: ORTHOPAEDIC SURGERY
Payer: MEDICARE

## 2022-02-16 ENCOUNTER — APPOINTMENT (OUTPATIENT)
Dept: GENERAL RADIOLOGY | Age: 77
End: 2022-02-16
Attending: ORTHOPAEDIC SURGERY
Payer: MEDICARE

## 2022-02-16 VITALS
RESPIRATION RATE: 15 BRPM | WEIGHT: 222 LBS | DIASTOLIC BLOOD PRESSURE: 55 MMHG | HEART RATE: 80 BPM | TEMPERATURE: 98.5 F | OXYGEN SATURATION: 93 % | BODY MASS INDEX: 36.38 KG/M2 | SYSTOLIC BLOOD PRESSURE: 118 MMHG

## 2022-02-16 LAB — POTASSIUM BLD-SCNC: 4.1 MMOL/L (ref 3.5–5.1)

## 2022-02-16 PROCEDURE — 28445 OPTX TALUS FRACTURE: CPT | Performed by: ORTHOPAEDIC SURGERY

## 2022-02-16 PROCEDURE — 77030002916 HC SUT ETHLN J&J -A: Performed by: ORTHOPAEDIC SURGERY

## 2022-02-16 PROCEDURE — 77030002982 HC SUT POLYSRB J&J -A: Performed by: ORTHOPAEDIC SURGERY

## 2022-02-16 PROCEDURE — 2709999900 HC NON-CHARGEABLE SUPPLY: Performed by: ORTHOPAEDIC SURGERY

## 2022-02-16 PROCEDURE — 76010010054 HC POST OP PAIN BLOCK

## 2022-02-16 PROCEDURE — C1713 ANCHOR/SCREW BN/BN,TIS/BN: HCPCS | Performed by: ORTHOPAEDIC SURGERY

## 2022-02-16 PROCEDURE — 77030003747: Performed by: ORTHOPAEDIC SURGERY

## 2022-02-16 PROCEDURE — 27695 REPAIR OF ANKLE LIGAMENT: CPT | Performed by: ORTHOPAEDIC SURGERY

## 2022-02-16 PROCEDURE — 77030021122 HC SPLNT MAT FST BSNM -A: Performed by: ORTHOPAEDIC SURGERY

## 2022-02-16 PROCEDURE — 76210000021 HC REC RM PH II 0.5 TO 1 HR: Performed by: ORTHOPAEDIC SURGERY

## 2022-02-16 PROCEDURE — 76010010054 HC POST OP PAIN BLOCK: Performed by: ORTHOPAEDIC SURGERY

## 2022-02-16 PROCEDURE — 74011000250 HC RX REV CODE- 250: Performed by: NURSE ANESTHETIST, CERTIFIED REGISTERED

## 2022-02-16 PROCEDURE — 27814 TREATMENT OF ANKLE FRACTURE: CPT | Performed by: ORTHOPAEDIC SURGERY

## 2022-02-16 PROCEDURE — 74011250636 HC RX REV CODE- 250/636: Performed by: NURSE ANESTHETIST, CERTIFIED REGISTERED

## 2022-02-16 PROCEDURE — 76942 ECHO GUIDE FOR BIOPSY: CPT | Performed by: ORTHOPAEDIC SURGERY

## 2022-02-16 PROCEDURE — 27829 TREAT LOWER LEG JOINT: CPT | Performed by: ORTHOPAEDIC SURGERY

## 2022-02-16 PROCEDURE — 84132 ASSAY OF SERUM POTASSIUM: CPT

## 2022-02-16 PROCEDURE — 77030003602 HC NDL NRV BLK BBMI -B: Performed by: ANESTHESIOLOGY

## 2022-02-16 PROCEDURE — 77030003748: Performed by: ORTHOPAEDIC SURGERY

## 2022-02-16 PROCEDURE — 74011250636 HC RX REV CODE- 250/636: Performed by: ANESTHESIOLOGY

## 2022-02-16 PROCEDURE — 77030037210 HC INSTR DISP BB TAK THRD ARTH -B: Performed by: ORTHOPAEDIC SURGERY

## 2022-02-16 PROCEDURE — 77030020275 HC MISC ORTHOPEDIC: Performed by: ORTHOPAEDIC SURGERY

## 2022-02-16 PROCEDURE — 76060000035 HC ANESTHESIA 2 TO 2.5 HR: Performed by: ORTHOPAEDIC SURGERY

## 2022-02-16 PROCEDURE — 76210000006 HC OR PH I REC 0.5 TO 1 HR: Performed by: ORTHOPAEDIC SURGERY

## 2022-02-16 PROCEDURE — 76010000162 HC OR TIME 1.5 TO 2 HR INTENSV-TIER 1: Performed by: ORTHOPAEDIC SURGERY

## 2022-02-16 PROCEDURE — 77030000032 HC CUF TRNQT ZIMM -B: Performed by: ORTHOPAEDIC SURGERY

## 2022-02-16 PROCEDURE — 74011250636 HC RX REV CODE- 250/636: Performed by: ORTHOPAEDIC SURGERY

## 2022-02-16 DEVICE — SCREW BONE L48MM DIA3.5MM TI CORT LO PROF: Type: IMPLANTABLE DEVICE | Site: ANKLE | Status: FUNCTIONAL

## 2022-02-16 DEVICE — SCREW BNE L14MM DIA3MM FT ANK TI VAR ANG LCK LO PROF: Type: IMPLANTABLE DEVICE | Site: ANKLE | Status: FUNCTIONAL

## 2022-02-16 DEVICE — PLATE BONE 4 H RT DSTL FIB TI LCK: Type: IMPLANTABLE DEVICE | Site: ANKLE | Status: FUNCTIONAL

## 2022-02-16 DEVICE — SCREW BNE L12MM DIA3MM FT ANK TI VAR ANG LOK LO PROF FOR: Type: IMPLANTABLE DEVICE | Site: ANKLE | Status: FUNCTIONAL

## 2022-02-16 DEVICE — SCREW BNE L14MM DIA3.5MM CORT TI ST NONLOCKING HD LO PROF: Type: IMPLANTABLE DEVICE | Site: ANKLE | Status: FUNCTIONAL

## 2022-02-16 DEVICE — SCR BNE CORT LP 3.5X12MM TI --: Type: IMPLANTABLE DEVICE | Site: ANKLE | Status: FUNCTIONAL

## 2022-02-16 DEVICE — SCREW BONE L10MM DIA3.5MM TI CORT LO PROF: Type: IMPLANTABLE DEVICE | Site: ANKLE | Status: FUNCTIONAL

## 2022-02-16 DEVICE — SCREW BONE L24MM DIA3.5MM ANK TI LO PROF: Type: IMPLANTABLE DEVICE | Site: ANKLE | Status: FUNCTIONAL

## 2022-02-16 RX ORDER — SODIUM CHLORIDE 0.9 % (FLUSH) 0.9 %
5-40 SYRINGE (ML) INJECTION EVERY 8 HOURS
Status: DISCONTINUED | OUTPATIENT
Start: 2022-02-16 | End: 2022-02-16 | Stop reason: HOSPADM

## 2022-02-16 RX ORDER — ROPIVACAINE HYDROCHLORIDE 5 MG/ML
INJECTION, SOLUTION EPIDURAL; INFILTRATION; PERINEURAL
Status: COMPLETED | OUTPATIENT
Start: 2022-02-16 | End: 2022-02-16

## 2022-02-16 RX ORDER — FENTANYL CITRATE 50 UG/ML
100 INJECTION, SOLUTION INTRAMUSCULAR; INTRAVENOUS ONCE
Status: COMPLETED | OUTPATIENT
Start: 2022-02-16 | End: 2022-02-16

## 2022-02-16 RX ORDER — ACETAMINOPHEN 500 MG
1000 TABLET ORAL ONCE
Status: DISCONTINUED | OUTPATIENT
Start: 2022-02-16 | End: 2022-02-16 | Stop reason: HOSPADM

## 2022-02-16 RX ORDER — LIDOCAINE HYDROCHLORIDE 10 MG/ML
0.1 INJECTION INFILTRATION; PERINEURAL AS NEEDED
Status: DISCONTINUED | OUTPATIENT
Start: 2022-02-16 | End: 2022-02-16 | Stop reason: HOSPADM

## 2022-02-16 RX ORDER — SODIUM CHLORIDE 0.9 % (FLUSH) 0.9 %
5-40 SYRINGE (ML) INJECTION AS NEEDED
Status: DISCONTINUED | OUTPATIENT
Start: 2022-02-16 | End: 2022-02-16 | Stop reason: HOSPADM

## 2022-02-16 RX ORDER — PROPOFOL 10 MG/ML
INJECTION, EMULSION INTRAVENOUS AS NEEDED
Status: DISCONTINUED | OUTPATIENT
Start: 2022-02-16 | End: 2022-02-16 | Stop reason: HOSPADM

## 2022-02-16 RX ORDER — CLINDAMYCIN PHOSPHATE 900 MG/50ML
900 INJECTION INTRAVENOUS ONCE
Status: COMPLETED | OUTPATIENT
Start: 2022-02-16 | End: 2022-02-16

## 2022-02-16 RX ORDER — PROPOFOL 10 MG/ML
INJECTION, EMULSION INTRAVENOUS
Status: DISCONTINUED | OUTPATIENT
Start: 2022-02-16 | End: 2022-02-16 | Stop reason: HOSPADM

## 2022-02-16 RX ORDER — MIDAZOLAM HYDROCHLORIDE 1 MG/ML
2 INJECTION, SOLUTION INTRAMUSCULAR; INTRAVENOUS
Status: DISCONTINUED | OUTPATIENT
Start: 2022-02-16 | End: 2022-02-16 | Stop reason: HOSPADM

## 2022-02-16 RX ORDER — ONDANSETRON 2 MG/ML
4 INJECTION INTRAMUSCULAR; INTRAVENOUS ONCE
Status: DISCONTINUED | OUTPATIENT
Start: 2022-02-16 | End: 2022-02-16 | Stop reason: HOSPADM

## 2022-02-16 RX ORDER — SODIUM CHLORIDE, SODIUM LACTATE, POTASSIUM CHLORIDE, CALCIUM CHLORIDE 600; 310; 30; 20 MG/100ML; MG/100ML; MG/100ML; MG/100ML
100 INJECTION, SOLUTION INTRAVENOUS CONTINUOUS
Status: DISCONTINUED | OUTPATIENT
Start: 2022-02-16 | End: 2022-02-16 | Stop reason: HOSPADM

## 2022-02-16 RX ORDER — OXYCODONE HYDROCHLORIDE 5 MG/1
5 TABLET ORAL
Status: DISCONTINUED | OUTPATIENT
Start: 2022-02-16 | End: 2022-02-16 | Stop reason: HOSPADM

## 2022-02-16 RX ORDER — SODIUM CHLORIDE, SODIUM LACTATE, POTASSIUM CHLORIDE, CALCIUM CHLORIDE 600; 310; 30; 20 MG/100ML; MG/100ML; MG/100ML; MG/100ML
75 INJECTION, SOLUTION INTRAVENOUS CONTINUOUS
Status: DISCONTINUED | OUTPATIENT
Start: 2022-02-16 | End: 2022-02-16 | Stop reason: HOSPADM

## 2022-02-16 RX ORDER — MIDAZOLAM HYDROCHLORIDE 1 MG/ML
2 INJECTION, SOLUTION INTRAMUSCULAR; INTRAVENOUS ONCE
Status: COMPLETED | OUTPATIENT
Start: 2022-02-16 | End: 2022-02-16

## 2022-02-16 RX ORDER — NALOXONE HYDROCHLORIDE 0.4 MG/ML
0.1 INJECTION, SOLUTION INTRAMUSCULAR; INTRAVENOUS; SUBCUTANEOUS AS NEEDED
Status: DISCONTINUED | OUTPATIENT
Start: 2022-02-16 | End: 2022-02-16 | Stop reason: HOSPADM

## 2022-02-16 RX ORDER — EPHEDRINE SULFATE/0.9% NACL/PF 50 MG/5 ML
SYRINGE (ML) INTRAVENOUS AS NEEDED
Status: DISCONTINUED | OUTPATIENT
Start: 2022-02-16 | End: 2022-02-16 | Stop reason: HOSPADM

## 2022-02-16 RX ORDER — HYDROMORPHONE HYDROCHLORIDE 2 MG/ML
0.5 INJECTION, SOLUTION INTRAMUSCULAR; INTRAVENOUS; SUBCUTANEOUS
Status: DISCONTINUED | OUTPATIENT
Start: 2022-02-16 | End: 2022-02-16 | Stop reason: HOSPADM

## 2022-02-16 RX ORDER — OXYCODONE HYDROCHLORIDE 5 MG/1
10 TABLET ORAL
Status: DISCONTINUED | OUTPATIENT
Start: 2022-02-16 | End: 2022-02-16 | Stop reason: HOSPADM

## 2022-02-16 RX ORDER — LIDOCAINE HYDROCHLORIDE 20 MG/ML
INJECTION, SOLUTION EPIDURAL; INFILTRATION; INTRACAUDAL; PERINEURAL AS NEEDED
Status: DISCONTINUED | OUTPATIENT
Start: 2022-02-16 | End: 2022-02-16 | Stop reason: HOSPADM

## 2022-02-16 RX ADMIN — SODIUM CHLORIDE, SODIUM LACTATE, POTASSIUM CHLORIDE, AND CALCIUM CHLORIDE 100 ML/HR: 600; 310; 30; 20 INJECTION, SOLUTION INTRAVENOUS at 08:43

## 2022-02-16 RX ADMIN — FENTANYL CITRATE 100 MCG: 50 INJECTION, SOLUTION INTRAMUSCULAR; INTRAVENOUS at 08:38

## 2022-02-16 RX ADMIN — CLINDAMYCIN PHOSPHATE 900 MG: 900 INJECTION, SOLUTION INTRAVENOUS at 09:31

## 2022-02-16 RX ADMIN — PROPOFOL 100 MCG/KG/MIN: 10 INJECTION, EMULSION INTRAVENOUS at 09:30

## 2022-02-16 RX ADMIN — ROPIVACAINE HYDROCHLORIDE 20 ML: 5 INJECTION, SOLUTION EPIDURAL; INFILTRATION; PERINEURAL at 08:46

## 2022-02-16 RX ADMIN — Medication 10 MG: at 10:22

## 2022-02-16 RX ADMIN — Medication 10 MG: at 10:13

## 2022-02-16 RX ADMIN — PROPOFOL 50 MG: 10 INJECTION, EMULSION INTRAVENOUS at 09:30

## 2022-02-16 RX ADMIN — MIDAZOLAM 2 MG: 1 INJECTION INTRAMUSCULAR; INTRAVENOUS at 08:38

## 2022-02-16 RX ADMIN — LIDOCAINE HYDROCHLORIDE 100 MG: 20 INJECTION, SOLUTION EPIDURAL; INFILTRATION; INTRACAUDAL; PERINEURAL at 09:30

## 2022-02-16 RX ADMIN — ROPIVACAINE HYDROCHLORIDE 30 ML: 5 INJECTION, SOLUTION EPIDURAL; INFILTRATION; PERINEURAL at 08:42

## 2022-02-16 NOTE — OP NOTES
Operative Note    Patient:Kelly Marino  MRN: [id number]    Date Of Surgery: 2/16/2022    Surgeon: Joselyn Pritchett MD    Assistant Surgeon: None    Procedure Performed:  right  ORIF Seth Ankle Fracture CPT 61872  Open Treatment of Talus Fracture CPT 32187  Repair, primary, disrupted ligament, collateral (Deltoid repair) CPT 53160  ORIF syndesmosis CPT 31480    Pre Op Diagnosis:  Right bimalleolar ankle fracture  Right deltoid tear    Post Op Diagnosis:   same    Implants:   Implant Name Type Inv. Item Serial No.  Lot No. LRB No. Used Action   FIBERTAK SUTURE ANCHOR KNOTLESS SUTURE    [employer] [id number] Right 2 Implanted   PLATE BONE 4 H RT DSTL FIB TI LCK - [id number]  PLATE BONE 4 H RT DSTL FIB TI LCK  [employer] [id number] Right 1 Implanted   SCREW BNE L12MM DIA3MM FT ANK TI YASMINE ANG RAFIQ LO PROF FOR - [id number]  SCREW BNE L12MM DIA3MM FT ANK TI YASMINE ANG RAFIQ LO PROF FOR  [employer] [id number] Right 2 Implanted   SCREW BNE L14MM DIA3MM FT ANK TI YASMINE ANG LCK LO PROF - [id number]  SCREW BNE L14MM DIA3MM FT ANK TI YASMINE ANG LCK LO PROF  [email or web address] [id number] Right 2 Implanted   SCR BNE NAILA LP 3.5X10MM TI --  - [id number]  SCR BNE NAILA LP 3.5X10MM TI --   [employer] [id number] Right 1 Implanted   SCREW BONE L24MM DIA3. 5MM ANK TI LO PROF - [id number]  SCREW BONE L24MM DIA3. 5MM ANK TI LO PROF  [email or web address] [id number] Right 1 Implanted   SCR BNE NAILA LP 3.5X14MM TI --  - [id number]  SCR BNE NAILA LP 3.5X14MM TI --   [employer] [id number] Right 1 Implanted   SCR BNE NAILA LP 3.5X12MM TI --  - [id number]  SCR BNE NAILA LP 3.5X12MM TI --   [employer] [id number] Right 1 Implanted   SCREW BONE L48MM DIA3. 5MM TI NAILA LO PROF - [id number]  SCREW BONE L48MM DIA3. 5MM TI NAILA LO PROF  [employer] [id number] Right 1 Implanted       Anesthesia:  With  MAC with preoperative popliteal saphenous block    Blood Loss:  10cc    Tourniquet:  Estimated calf tourniquet at 250 mmHg-15 minutes    Pre Operative Abx:   Clindamycin 900mg    Specimens/Cultures:  none    Significant Findings: There was a large lateral talar dome lesion noted with an osteochondral fracture. This fragment was too thin to be fixated. Therefore the osteochondral fracture fragment was excised and the lesion was microfracture. Complete rupture of the deltoid with a medial malleolus avulsion fracture. The avulsion fracture is too small to fixate. Therefore we fixed it with suture anchors directly to the bone. Bone quality was very poor in the fibula so therefore decided to place a syndesmotic screw to add fixation. Anterior medial superficial wound abrasion noted. No signs of infection. Pre Operative Course:  Shabbir Foster is a 64 y.o. female who sustained a fall from standing last week while leaving the house. .  After discussion with the patient we decided to proceed with surgical fixation of the ankle with plates, screws, and wires. Operation In Detail:  Patient was evaluated in the preoperative area. The right lower extremity was marked by me. We had a long discussion about the procedure and postoperative protocols. The patient was then brought back to the operating room suite and placed in the operating room table. A timeout was taken to identify the patient, procedure being performed, and laterality. After this the patient was prepped and draped in the normal sterile fashion using a Betadine solution and/or a ChloraPrep solution. A timeout was then taken to identify the patient his name, date of birth, laterality, and procedure being performed. We also identified allergies and any concerns about the operation. Attention was then placed to the operative extremity. Intravenous antibiotics were given: 900 mg clindamycin IV    The leg was exsanguinated with an Esmarch and a calf tourniquet was inflated to 250 mmHg. A medial incision was made over the medial malleolus. I incised the skin.   Up to the neurovascular bundle retracted anteriorly. There is complete rupture of the deltoid ligament but superficial and deep. The joint fluid was extruded directly underneath the skin. After dissection down to the joint there was a medial malleolus fracture fragment that was still attached to the deltoid ligament. It is too small to fixate. Therefore we treat the medial malleolus fracture with fragment excision. There is also several small loose bodies from the medial malleolus fracture tract within the medial joint space. I took a rongeur and removed all the chondral fragments and bone fragments from the ankle joint medially. I debrided all the damaged deltoid ligament. There is a significant deltoid tear. After debridement of all the damaged deltoid ligaments I felt there was more of an avulsion fracture with a tear. Therefore primary repair of some of the deltoid ligament fibers together with a 2-0 Vicryl suture. I then took 2 Arthrex fiber tack anchors and drilled them in the anterior colliculus in the center of the medial mall. These were done underneath x-ray and were at the tip of the medial malleolus. I did not tie them or fix the deltoid yet. Attention was then placed laterally. A medial incision of the lateral skin. She would blow into her tourniquets were let the tourniquet down. I incised the skin, protect the superficial peroneal nerve, identify the fracture site. I cleaned the fracture site off with a rongeur curette and irrigation. The fracture was multiple small fragments. There was a William tubercle fracture and a separate fracture noted. I irrigated debrided out all the comminuted fracture fragments. Is able to mobilize the fracture very well get a good smooth edge. I did examine the lateral talar dome peer there is a large osteochondral fracture of the talar dome noted. The osteochondral fracture of the lateral talar dome was at the most anterior articular surface. It measured approximately 20 mm in length by 12 mm in width. It was full-thickness. The large fracture fragment was trapped in the anterior soft tissues. I removed this fracture fragment. It was very thin however there was a chondral flap with cancellous bone and cortical bone attached to it. I thought it was too thin to fixate. Therefore excised this fracture fragment. I examined the fracture site. I took a K wire and drilled 4 holes into this fracture site. Since the tourniquet down bleeding was immediately noted. I microfracture the fracture site. I was then able to patient and the fibula. I was able to take 2 clamps and gain length alignment rotation reduced the fibula. I attempted to start by placing a 3.5 mm lag screw however her bone quality of was poor and it cracked out the fibular cortex. I then was able to move proximally. I used a 2.7 mm lag screw in lag of the fracture. After letting the fracture I placed a right lateral fibular locking plate. After the locking plate appropriately with multiple small fracture fragments, mild comminution, and very poor bone quality. I placed the fibula plate on the fibula. I checked any fluoroscopy and I wiped the reduction. I then therefore placed 4 locking screws in the distal fibula to adequately fixate the distal fibula. I then placed 3 nonlocking screws proximally in the plate. This provided 3 points of fixation proximally and 4 points fixation distally. I then worked my way back medially. I took the Arthrex fiber tacks and through a grasping suture through the anterior band of the deltoid both deep and superficial and then the central band the deltoid both deep and superficial.  I protected myself from damaging the posterior tibial tendon. It was identified by damage. I then cinched the deltoid back up to its insertion. After cinching the deltoid back to its is insertion, I oversewed the deltoid tear with a 2-0 Vicryl.   I irrigated out this wound copiously. I then went back laterally. There was a lack staff and I should put tubercle fracture that were very loose. I sewed them back to the bone with a 2-0 Vicryl. However the fibula seem to rotate beyond normal parameters. Her bone quality was also very poor. I felt more fixation was needed to make sure this fracture healed appropriately. Therefore I placed 1 syndesmotic screw through the fibula plate, and bicortically into the tibia. 4 cortices were used for fixation of the syndesmosis. .  After fixation of the syndesmosis I irrigated out both medially and laterally. I took AP, mortise, and lateral x-rays. Provide stress views on each along with a cotton and external stress view. The syndesmosis are stable. Fracture stable. The ankle moves well without any crepitance. I then irrigated the joint copiously. I closed the subcutaneous skin with 3-0 Monocryl. Postoperative skin with a 3-0 nylon. The anterior abraded skin of the ankle was covered with a sterile petroleum-based dressing. A sterile dressing was then applied to the leg and Posterior slab splint with Stirrups. They were awoken from anesthesia and returned to the PACU without difficulty. Post Operative Plan:   1- WB status: NWB Operative Extremity  2- Follow Up: 2-3 weeks  3- Immobilization/assistive devices: crutches and knee scooter  4- DVT px: Aspirin 81 mg p.o. twice daily aspirin  5- Pain Medication: Percocet 5mg/325 q6 PRN pain #30  6- NWB x 2 weeks. Follow up for stitch removal and wound check. 7-clindamycin 300 mg 3 times daily to prevent superficial infection from the abrasions noted throughout her anterior ankle.

## 2022-02-16 NOTE — ANESTHESIA PREPROCEDURE EVALUATION
Relevant Problems   RESPIRATORY SYSTEM   (+) Asthma      NEUROLOGY   (+) Depression      CARDIOVASCULAR   (+) Hypertension      GASTROINTESTINAL   (+) GERD (gastroesophageal reflux disease)      ENDOCRINE   (+) Hypothyroidism   (+) Severe obesity (BMI 35.0-39. 9) with comorbidity (HCC)       Anesthetic History     Increased risk of difficult airway (Grade 3 DL- successful glidescope use) and PONV          Review of Systems / Medical History  Patient summary reviewed, nursing notes reviewed and pertinent labs reviewed    Pulmonary        Sleep apnea: CPAP    Asthma : well controlled       Neuro/Psych         Psychiatric history     Cardiovascular    Hypertension: well controlled          Hyperlipidemia    Exercise tolerance: >4 METS     GI/Hepatic/Renal     GERD: well controlled           Endo/Other      Hypothyroidism: well controlled  Obesity, arthritis and cancer     Other Findings              Physical Exam    Airway  Mallampati: III      Mouth opening: Normal     Cardiovascular  Regular rate and rhythm,  S1 and S2 normal,  no murmur, click, rub, or gallop             Dental  No notable dental hx       Pulmonary  Breath sounds clear to auscultation               Abdominal         Other Findings            Anesthetic Plan    ASA: 3  Anesthesia type: total IV anesthesia      Post-op pain plan if not by surgeon: peripheral nerve block single    Induction: Intravenous  Anesthetic plan and risks discussed with: Patient      Glidescope utilized 5/21

## 2022-02-16 NOTE — ANESTHESIA POSTPROCEDURE EVALUATION
Procedure(s):  ANKLE OPEN REDUCTION INTERNAL FIXATION/ RIGHT/ POP SAPH BLOCK.    total IV anesthesia    Anesthesia Post Evaluation      Multimodal analgesia: multimodal analgesia used between 6 hours prior to anesthesia start to PACU discharge  Patient location during evaluation: bedside  Patient participation: complete - patient participated  Level of consciousness: responsive to verbal stimuli  Pain management: adequate  Airway patency: patent  Anesthetic complications: no  Cardiovascular status: hemodynamically stable  Respiratory status: spontaneous ventilation  Hydration status: stable    Final Post Anesthesia Temperature Assessment:  Normothermia (36.0-37.5 degrees C)      INITIAL Post-op Vital signs:   Vitals Value Taken Time   /54 02/16/22 1137   Temp 36.9 °C (98.5 °F) 02/16/22 1128   Pulse 70 02/16/22 1141   Resp 18 02/16/22 1128   SpO2 99 % 02/16/22 1141   Vitals shown include unvalidated device data.

## 2022-02-16 NOTE — DISCHARGE INSTRUCTIONS
INSTRUCTIONS FOLLOWING FOOT SURGERY    ACTIVITY  Elevate foot. No Ice    No weight bearing. Use crutches or knee walker until seen in follow up appointment  Don't put anything into the splint to relieve itching etc.     Let the office know if dressing gets saturated with water . Blood clot prevention:  As instructed by Dr Danielle Lux: Take 81mg aspirin twice daily if ok with your medical doctor and you have no GI Ulcer. Get up and out of bed frequently. While in bed move the legs as much as possible. DRESSING CARE Keep dry and in place until follow up appointment. Cover with cast bag or plastic bag when showering. CALL YOUR DOCTOR IF YOU HAVE  Excessive bleeding that does not stop after holding mild pressure over the area. Temperature of 101 degrees or above. Redness, excessive swelling or bruising, and/or green or yellow, smelly discharge from incision. Loss of sensation - cold, white or blue toes. DIET  Day of Surgery: Clear liquids until no nausea or vomiting; then light, bland diet (Baked chicken, plain rice, grits, scrambled egg, toast). Nothing Greasy, fried or spicy today  Advance to regular diet on second day, unless your doctor orders otherwise. PAIN  Take pain medications as directed by your doctor. Call your doctor if pain is NOT relieved by medication. PAIN MED SIDE EFFECTS  Constipation: Lots of fluids, try prune juice, then OTC stool softeners if necessary  Nausea: Take medication with food. MEDICATION INTERACTION:  During your procedure you potentially received a medication or medications which may reduce the effectiveness of oral contraceptives. Please consider other forms of contraception for 1 month following your procedure if you are currently using oral contraceptives as your primary form of birth control.  In addition to this, we recommend continuing your oral contraceptive as prescribed, unless otherwise instructed by your physician, during this time    After general anesthesia or intravenous sedation, for 24 hours or while taking prescription Narcotics:  · Limit your activities  · A responsible adult needs to be with you for the next 24 hours  · Do not drive and operate hazardous machinery  · Do not make important personal or business decisions  · Do not drink alcoholic beverages  · If you have not urinated within 8 hours after discharge, and you are experiencing discomfort from urinary retention, please go to the nearest ED. · If you have sleep apnea and have a CPAP machine, please use it for all naps and sleeping. · Please use caution when taking narcotics and any of your home medications that may cause drowsiness. *  Please give a list of your current medications to your Primary Care Provider. *  Please update this list whenever your medications are discontinued, doses are      changed, or new medications (including over-the-counter products) are added. *  Please carry medication information at all times in case of emergency situations. These are general instructions for a healthy lifestyle:  No smoking/ No tobacco products/ Avoid exposure to second hand smoke  Surgeon General's Warning:  Quitting smoking now greatly reduces serious risk to your health. Obesity, smoking, and sedentary lifestyle greatly increases your risk for illness  A healthy diet, regular physical exercise & weight monitoring are important for maintaining a healthy lifestyle    You may be retaining fluid if you have a history of heart failure or if you experience any of the following symptoms:  Weight gain of 3 pounds or more overnight or 5 pounds in a week, increased swelling in our hands or feet or shortness of breath while lying flat in bed. Please call your doctor as soon as you notice any of these symptoms; do not wait until your next office visit.

## 2022-02-16 NOTE — PERIOP NOTES
Discharge instructions reviewed with pt and pt's  at bedside. No questions or concerns at this time. Pt already picked up rxs. Pt has crutches and knee scooter at home.

## 2022-03-19 PROBLEM — Z79.899 ENCOUNTER FOR MEDICATION MANAGEMENT: Status: ACTIVE | Noted: 2019-01-02

## 2022-03-19 PROBLEM — R29.3 POSTURAL IMBALANCE: Status: ACTIVE | Noted: 2019-01-02

## 2022-03-19 PROBLEM — G25.81 RESTLESS LEG SYNDROME: Status: ACTIVE | Noted: 2019-01-02

## 2022-03-20 PROBLEM — F11.99 OPIOID USE, UNSPECIFIED WITH UNSPECIFIED OPIOID-INDUCED DISORDER (HCC): Status: ACTIVE | Noted: 2022-02-08

## 2022-03-20 PROBLEM — E66.01 SEVERE OBESITY (BMI 35.0-39.9) WITH COMORBIDITY (HCC): Status: ACTIVE | Noted: 2018-03-27
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
## 2022-03-20 PROBLEM — R25.1 TREMOR: Status: ACTIVE | Noted: 2019-01-02

## 2022-05-26 RX ORDER — PROPYLENE GLYCOL/PEG 400 0.3 %-0.4%
DROPS OPHTHALMIC (EYE)
Qty: 42 TABLET | OUTPATIENT
Start: 2022-05-26

## 2022-05-31 ENCOUNTER — OFFICE VISIT (OUTPATIENT)
Dept: ORTHOPEDIC SURGERY | Age: 77
End: 2022-05-31
Payer: MEDICARE

## 2022-05-31 DIAGNOSIS — M25.571 ACUTE RIGHT ANKLE PAIN: Primary | ICD-10-CM

## 2022-05-31 PROCEDURE — 1090F PRES/ABSN URINE INCON ASSESS: CPT | Performed by: ORTHOPAEDIC SURGERY

## 2022-05-31 PROCEDURE — G8428 CUR MEDS NOT DOCUMENT: HCPCS | Performed by: ORTHOPAEDIC SURGERY

## 2022-05-31 PROCEDURE — G8417 CALC BMI ABV UP PARAM F/U: HCPCS | Performed by: ORTHOPAEDIC SURGERY

## 2022-05-31 PROCEDURE — 1123F ACP DISCUSS/DSCN MKR DOCD: CPT | Performed by: ORTHOPAEDIC SURGERY

## 2022-05-31 PROCEDURE — 4004F PT TOBACCO SCREEN RCVD TLK: CPT | Performed by: ORTHOPAEDIC SURGERY

## 2022-05-31 PROCEDURE — G8400 PT W/DXA NO RESULTS DOC: HCPCS | Performed by: ORTHOPAEDIC SURGERY

## 2022-05-31 PROCEDURE — 99213 OFFICE O/P EST LOW 20 MIN: CPT | Performed by: ORTHOPAEDIC SURGERY

## 2022-05-31 NOTE — PROGRESS NOTES
Name: Yara Monk  YOB: 1945  Gender: female  MRN: [id number]    Summary: right ORIF by victor[facility] with talar OCD and syndesmosis and deltoid repair. 2/16/2022 no pain. Continue PT. Follow-up in 3 to 4 months      Ankle XR needed @ Next Visit     CC: right ankle swelling    HPI: Yara Monk is a 64 y.o. female sustained an ankle fracture and underwent ORIF on 2/16/2022. She is physical therapy. She is walking with a cane. She states her pain is 0. She does have some tightness and stiffness. The physical therapist notes an Achilles contracture and some pain in the Achilles    History was obtained by spouse and patient    ROS/Meds/PSH/PMH/FH/SH: I personally reviewed the patients standard intake form. Below are the pertinents    Tobacco:  reports that she has never smoked. She has never used smokeless tobacco.  Diabetes: None  Other: none    Physical Examination:  right lower: neurovascular intact the foot. EHL/FHL/AT/PT/Jayson/Achilles intact. TTP at Achilles  Great range of motion to plantarflexion and dorsiflexion. Wounds of healed well. No signs of infection. No signs of crepitance. No hardware prominence    Imaging:   Patient declined imaging today as she was having no pain     Assessment:   Stable postoperative right ankle fracture  Right Achilles adenitis    Plan:   3 This is stable chronic illness/condition  Treatment at this time: OTC NSAIDS and Physical Therapy  Studies ordered: Ankle XR needed @ Next Visit    Weight-bearing status: WBAT        Return to work/work restrictions: none  none    She is doing very well. As her activities increase she does have little bit of Achilles tendinitis. Her ankle appears stable.

## 2022-09-06 ENCOUNTER — OFFICE VISIT (OUTPATIENT)
Dept: ORTHOPEDIC SURGERY | Age: 77
End: 2022-09-06
Payer: MEDICARE

## 2022-09-06 DIAGNOSIS — M25.571 ACUTE RIGHT ANKLE PAIN: Primary | ICD-10-CM

## 2022-09-06 PROCEDURE — G8400 PT W/DXA NO RESULTS DOC: HCPCS | Performed by: ORTHOPAEDIC SURGERY

## 2022-09-06 PROCEDURE — 99213 OFFICE O/P EST LOW 20 MIN: CPT | Performed by: ORTHOPAEDIC SURGERY

## 2022-09-06 PROCEDURE — 1090F PRES/ABSN URINE INCON ASSESS: CPT | Performed by: ORTHOPAEDIC SURGERY

## 2022-09-06 PROCEDURE — 4004F PT TOBACCO SCREEN RCVD TLK: CPT | Performed by: ORTHOPAEDIC SURGERY

## 2022-09-06 PROCEDURE — G8427 DOCREV CUR MEDS BY ELIG CLIN: HCPCS | Performed by: ORTHOPAEDIC SURGERY

## 2022-09-06 PROCEDURE — G8417 CALC BMI ABV UP PARAM F/U: HCPCS | Performed by: ORTHOPAEDIC SURGERY

## 2022-09-06 PROCEDURE — 1123F ACP DISCUSS/DSCN MKR DOCD: CPT | Performed by: ORTHOPAEDIC SURGERY

## 2022-09-06 NOTE — PROGRESS NOTES
Name: Bethany Rogers  YOB: 1945  Gender: female  MRN: [id number]    Summary: right ORIF by mall with talar OCD and syndesmosis and deltoid repair. 2/16/2022 no pain. Fu PRN     CC: right ankle swelling    HPI: Bethany Rogers is a 76 y.o. female sustained an ankle fracture and underwent ORIF on 2/16/2022. She is no longer doing physical therapy. She is walking with a cane. She states her pain is 0. She states she is fully recovered from her ankle fracture and and she also knows she can be released today. She is doing very well. She will start her left bunion that she had done years ago. Is doing well. History was obtained by spouse and patient    ROS/Meds/PSH/PMH/FH/SH: I personally reviewed the patients standard intake form. Below are the pertinents    Tobacco:  reports that she has never smoked. She has never used smokeless tobacco.  Diabetes: None  Other: none    Physical Examination:  right lower: neurovascular intact the foot. EHL/FHL/AT/PT/Jayson/Achilles intact. TTP at Achilles  Great range of motion to plantarflexion and dorsiflexion. Wounds of healed well. No signs of infection. No signs of crepitance. No hardware prominence healed left bunion scar with minimal recurrence. Imaging:   I independently interpreted XR taken today and   X-Ray RIGHT Ankle 3 vw (AP/Lateral/Oblique) for ankle pain   Findings: Distal fibula plate with syndesmosis screw intact. Ankle mortise aligned well. No signs of dislocations. No signs of loss of fixation. No signs of hardware breakage. No signs of arthritis. Impression: Stable postoperative ankle   Signature: Inessa De Jesus MD        Assessment:   Stable postoperative right ankle fracture  Left bunion    Plan:   3 This is stable chronic illness/condition  Treatment at this time: OTC NSAIDS  Studies ordered:  Ankle XR needed @ Next Visit    Weight-bearing status: WBAT        Return to work/work restrictions: none  none  Bunion spacers given for bunion.

## 2023-01-18 ENCOUNTER — OFFICE VISIT (OUTPATIENT)
Dept: ENT CLINIC | Age: 78
End: 2023-01-18
Payer: MEDICARE

## 2023-01-18 VITALS — HEIGHT: 66 IN | BODY MASS INDEX: 35.2 KG/M2 | OXYGEN SATURATION: 97 % | WEIGHT: 219 LBS | HEART RATE: 102 BPM

## 2023-01-18 DIAGNOSIS — H61.23 IMPACTED CERUMEN, BILATERAL: ICD-10-CM

## 2023-01-18 DIAGNOSIS — J32.9 CHRONIC RHINOSINUSITIS: Primary | ICD-10-CM

## 2023-01-18 DIAGNOSIS — Z98.890 S/P FUNCTIONAL ENDOSCOPIC SINUS SURGERY: ICD-10-CM

## 2023-01-18 DIAGNOSIS — J31.0 CHRONIC RHINOSINUSITIS: Primary | ICD-10-CM

## 2023-01-18 PROCEDURE — 69210 REMOVE IMPACTED EAR WAX UNI: CPT | Performed by: STUDENT IN AN ORGANIZED HEALTH CARE EDUCATION/TRAINING PROGRAM

## 2023-01-18 PROCEDURE — G8484 FLU IMMUNIZE NO ADMIN: HCPCS | Performed by: STUDENT IN AN ORGANIZED HEALTH CARE EDUCATION/TRAINING PROGRAM

## 2023-01-18 PROCEDURE — 1123F ACP DISCUSS/DSCN MKR DOCD: CPT | Performed by: STUDENT IN AN ORGANIZED HEALTH CARE EDUCATION/TRAINING PROGRAM

## 2023-01-18 PROCEDURE — 1036F TOBACCO NON-USER: CPT | Performed by: STUDENT IN AN ORGANIZED HEALTH CARE EDUCATION/TRAINING PROGRAM

## 2023-01-18 PROCEDURE — 99213 OFFICE O/P EST LOW 20 MIN: CPT | Performed by: STUDENT IN AN ORGANIZED HEALTH CARE EDUCATION/TRAINING PROGRAM

## 2023-01-18 PROCEDURE — 1090F PRES/ABSN URINE INCON ASSESS: CPT | Performed by: STUDENT IN AN ORGANIZED HEALTH CARE EDUCATION/TRAINING PROGRAM

## 2023-01-18 PROCEDURE — G8417 CALC BMI ABV UP PARAM F/U: HCPCS | Performed by: STUDENT IN AN ORGANIZED HEALTH CARE EDUCATION/TRAINING PROGRAM

## 2023-01-18 PROCEDURE — G8427 DOCREV CUR MEDS BY ELIG CLIN: HCPCS | Performed by: STUDENT IN AN ORGANIZED HEALTH CARE EDUCATION/TRAINING PROGRAM

## 2023-01-18 PROCEDURE — G8400 PT W/DXA NO RESULTS DOC: HCPCS | Performed by: STUDENT IN AN ORGANIZED HEALTH CARE EDUCATION/TRAINING PROGRAM

## 2023-01-18 RX ORDER — MONTELUKAST SODIUM 4 MG/1
TABLET, CHEWABLE ORAL
COMMUNITY
Start: 2023-01-14

## 2023-01-18 RX ORDER — APIXABAN 5 MG/1
TABLET, FILM COATED ORAL
COMMUNITY
Start: 2023-01-01

## 2023-01-18 RX ORDER — LEVOTHYROXINE SODIUM 0.05 MG/1
TABLET ORAL
COMMUNITY
Start: 2022-11-14

## 2023-01-18 ASSESSMENT — ENCOUNTER SYMPTOMS
EYE DISCHARGE: 0
COLOR CHANGE: 0
APNEA: 0
ABDOMINAL DISTENTION: 0

## 2023-01-18 NOTE — PROGRESS NOTES
HPI:  Mello Rodriguez is a 76 y.o. female seen   Chief Complaint   Patient presents with    Cerumen Impaction     Patient presents today for annual cerumen removal . Patient notes sinus pressure/headache , she states that she has been using saline rinses which has helped . Patient states that she has not had sinus issues since surgery with Dr. Na Barry . 75-year-old female seen as a follow-up evaluation for her annual ear check with history of frequent cerumen impactions having the use of hearing aids. There has been some typical ear fullness symptoms but denies any otalgia otorrhea dizziness or vertigo. She also states having had a prior history of chronic rhinosinusitis having had endoscopic sinus surgery by Dr. Na Barry many years ago. She had little or no infections ever since that time although starting about 1 month ago she had URI type symptoms which has now gone to having her typical symptoms of sinusitis for which she describes as a significant sinonasal pain and pressure. This has now moderately alleviated with the use of over-the-counter sinus medications and saline rinses. Past Medical History, Past Surgical History, Family history, Social History, and Medications were all reviewed with the patient today and updated as necessary.      Allergies   Allergen Reactions    Cefaclor Diarrhea and Hives     IV OK  Other reaction(s): Hives/Swelling-Allergy    Cefuroxime Axetil Diarrhea and Hives     IV OK  Other reaction(s): Hives/Swelling-Allergy    Ciprofloxacin Hcl Hives     Other reaction(s): Hives/Swelling-Allergy    Clarithromycin Diarrhea and Hives     IV is OK  Other reaction(s): Hives/Swelling-Allergy  Other reaction(s): hives, Hives/Swelling-Allergy  IV is OK  Other reaction(s): Hives/Swelling-Allergy  IV is OK  Other reaction(s): Hives/Swelling-Allergy      Fluconazole Diarrhea and Hives     Other reaction(s): Hives/Swelling-Allergy  Other reaction(s): hives  Other reaction(s): Hives/Swelling-Allergy  Other reaction(s): Hives/Swelling-Allergy      Moxifloxacin Diarrhea and Hives     IV is OK  Other reaction(s): Hives/Swelling-Allergy  Other reaction(s): hives, Hives/Swelling-Allergy  IV is OK  Other reaction(s): Hives/Swelling-Allergy  IV is OK  Other reaction(s): Hives/Swelling-Allergy      Oxycodone      Other reaction(s): GI intolerance, Nausea and/or vomiting-Intolerance    Penicillins Diarrhea and Hives       Other reaction(s): Diarrhea-Intolerance, Hives/Swelling-Allergy    Other reaction(s): Diarrhea-Intolerance, hives, Hives/Swelling-Allergy, Other (see comments)    Other reaction(s): Diarrhea-Intolerance, Hives/Swelling-Allergy    Other reaction(s): Diarrhea-Intolerance, Hives/Swelling-Allergy      Shellfish Allergy Diarrhea and Hives     Other reaction(s): Hives/Swelling-Allergy  Other reaction(s): Hives/Swelling-Allergy--- denies known allergy to iodine, betadine, or contrast dyes  Other reaction(s): Hives/Swelling-Allergy--- denies known allergy to iodine, betadine, or contrast dyes      Cheese      Other reaction(s): Diarrhea-Intolerance, GI intolerance    Inositol Niacinate Diarrhea     Other reaction(s): Diarrhea-Intolerance    Lactose Diarrhea     Other reaction(s): Diarrhea-Intolerance    Niacin Diarrhea     Other reaction(s): Diarrhea-Intolerance    Other Diarrhea     Other reaction(s): Other (see comments), Unknown-Unspecified  Mold spores, pine   Mold spores, pine   Pine,mold: coughing and sneezing    Carrots, pork & cheese cause diarrhea - LACTOSE INTOLERANT. Pine & mold causes upper respiratory complications. Mold spores, pine   Pine,mold: coughing and sneezing    Carrots, pork & cheese cause diarrhea - LACTOSE INTOLERANT. Pine & mold causes upper respiratory complications. Cephalexin Other (See Comments)     Abdominal pain  Other reaction(s):  Other- (not listed) - Allergy  Abdominal pain/diarrhea       Patient Active Problem List   Diagnosis    GERD (gastroesophageal reflux disease)    Asthma    SNHL (sensorineural hearing loss)    Hypertension    Encounter for medication management    Restless leg syndrome    Hypokalemia    Allergic rhinitis    Hypothyroidism    Chronic knee pain    Thyroid nodule    Hyperlipidemia    Postural imbalance    Opioid use, unspecified with unspecified opioid-induced disorder (HCC)    Severe obesity (BMI 35.0-39. 9) with comorbidity ([facility] [address]    Depression    Tremor    Fall       Current Outpatient Medications   Medication Sig    ELIQUIS 5 MG TABS tablet TAKE 1 TABLET (5 MG) BY MOUTH 2 (TWO) TIMES A DAY    levothyroxine (SYNTHROID) 50 MCG tablet TAKE 1 TABLET BY MOUTH EVERY DAY    colestipol (COLESTID) 1 g tablet TAKE 2 TABLETS (2 G) BY MOUTH 2 (TWO) TIMES A DAY    Calcium Carb-Cholecalciferol 600-500 MG-UNIT CAPS Take 1 tablet by mouth daily    albuterol sulfate  (90 Base) MCG/ACT inhaler TAKE 2 PUFFS BY MOUTH EVERY 6 HOURS AS NEEDED FOR WHEEZE    amLODIPine (NORVASC) 10 MG tablet TAKE 1 TABLET (10 MG) BY MOUTH EVERY MORNING INDICATIONS HIGH BLOOD PRESSURE    atorvastatin (LIPITOR) 20 MG tablet Take 20 mg by mouth daily    azelastine (ASTELIN) 0.1 % nasal spray 1 spray by Nasal route 2 times daily as needed    azithromycin (ZITHROMAX) 250 MG tablet TAKE 2 TABLETS BY MOUTH TODAY, THEN TAKE 1 TABLET DAILY FOR 4 DAYS    celecoxib (CELEBREX) 200 MG capsule Take 200 mg by mouth 2 times daily    vitamin D 25 MCG (1000 UT) CAPS Take by mouth    desloratadine (CLARINEX) 5 MG tablet Take 5 mg by mouth    esomeprazole (NEXIUM) 40 MG delayed release capsule TAKE ONE CAPSULE BY MOUTH TWICE A DAY    fluticasone (FLONASE) 50 MCG/ACT nasal spray 2 times daily    hydroCHLOROthiazide (HYDRODIURIL) 25 MG tablet TAKE 1 TABLET EVERY DAY    levothyroxine (SYNTHROID) 200 MCG tablet Take 200 mcg by mouth every morning (before breakfast)    levothyroxine (SYNTHROID) 75 MCG tablet TAKE 1 TABLET EVERY MORNING BEFORE BREAKFAST    Magnesium 500 MG CAPS Take by mouth    metoprolol succinate (TOPROL XL) 50 MG extended release tablet Take 50 mg by mouth daily    ondansetron (ZOFRAN) 4 MG tablet Take 4 mg by mouth every 8 hours as needed    potassium chloride (KLOR-CON M) 20 MEQ extended release tablet Take 20 mEq by mouth 2 times daily    venlafaxine (EFFEXOR XR) 75 MG extended release capsule 225 mg daily    aspirin 81 MG EC tablet Take 81 mg by mouth 2 times daily     No current facility-administered medications for this visit.        Past Medical History:   Diagnosis Date    Abnormal serum cholesterol     Allergic rhinitis     Ankle fracture, right 2022    Arthritis     bilat hands & toes    Asthma     inhaler daily and prn    Breast cancer ([address] 2021    mastectomy- left     Breast cancer, left ([address] 1998    lumpectomy left breast 1998 and radiation    Chronic knee pain 10/21/2016    Chronic sinusitis     COVID-19 vaccine series completed     pfizer, second does 2/2021- will bring card    Degenerative cervical spinal stenosis     Depression     depression - pt takes zoloft    Deviated nasal septum     Difficult intubation     Grade 3 view on DL - glidescope used successfully     Disturbance of salivary secretion     GERD (gastroesophageal reflux disease)     nexium twice daily, and 2 pillows    Hoarseness     Hypercholesterolemia     controlled with medication    Hyperlipidemia     Hypertension     controlled with medication    Hypokalemia 10/21/2016    Hypothyroidism     Multilevel degenerative disc disease     Musculoskeletal pain     PONV (postoperative nausea and vomiting)     Postmenopausal bleeding     Prolapse of female genital organs     Sinus problem     SNHL (sensorineural hearing loss)     Thyroid disease     hypo - controlled with medication    Thyroid nodule     Unspecified sleep apnea     pt wears CPAP at night    Uterine prolapse        Past Surgical History:   Procedure Laterality Date    APPENDECTOMY      BREAST LUMPECTOMY Left     left lumpectomy and radiation    BREAST RECONSTRUCTION Left 5/18/2021    LEFT BREAST RECONSTRUCTION performed by Sherrell Elizondo MD at [place] [address] Left 5/18/2021    LEFT BREAST TISSUE EXPANDER INSERTION performed by Sherrell Elizondo MD at [place] [address] Left 8/31/2021    LEFT BREAST TISSUE EXPANDER IMPLANT EXCHANGE (FROM EXPANDER TO IMPLANT) performed by Sherrell Elizondo MD at [address] Bilateral 2021    saray    CYSTOTOMY,EXCIS BLADDER TUMOR      cysto X 8- last one 2016    HEENT      palatal surgery    HYSTERECTOMY (CERVIX STATUS UNKNOWN)      MASTECTOMY Left 5/18/2021    LEFT BREAST MASTECTOMY PREOP 11:30/ SURGERY 1:00 performed by Jatin Bates DO at [facility]      left foot bunion    ORTHOPEDIC SURGERY      left thumb    ORTHOPEDIC SURGERY      finger growths     ORTHOPEDIC SURGERY Right 4/2016    Toes on right foot    SEPTOPLASTY  7/8/04    Septo/ FESS    SHOULDER ARTHROPLASTY Left     SINUS SURGERY PROC UNLISTED Right 9/29/11    Balloon frontal sinuplasty/ irrigation and culture max sinus    TONSILLECTOMY      TOTAL HIP ARTHROPLASTY Left     TOTAL KNEE ARTHROPLASTY Right 7/8/14    TKR    TOTAL KNEE ARTHROPLASTY Left     TKR    US BREAST BIOPSY W LOC DEVICE 1ST LESION LEFT Left 4/7/2021    US BREAST NEEDLE BIOPSY LEFT 4/7/2021 [facility]    UVULOPALATOPHARYGOPLASTY         Social History     Tobacco Use    Smoking status: Never    Smokeless tobacco: Never   Substance Use Topics    Alcohol use: Not Currently       Family History   Problem Relation Age of Onset    Elevated Lipids Other     Hypertension Sister     Hypertension Other     Stroke Mother     Hypertension Mother         ROS:    Review of Systems   Constitutional:  Negative for activity change. HENT:  Negative for congestion. Eyes:  Negative for discharge. Respiratory:  Negative for apnea. Cardiovascular:  Negative for chest pain.    Gastrointestinal:  Negative for abdominal distention. Endocrine: Negative for cold intolerance. Genitourinary:  Negative for difficulty urinating. Musculoskeletal:  Negative for arthralgias. Skin:  Negative for color change. Allergic/Immunologic: Negative for environmental allergies. Neurological:  Negative for dizziness. Hematological:  Negative for adenopathy. Psychiatric/Behavioral:  Negative for agitation. PHYSICAL EXAM:    Pulse (!) 102   Ht 5' 6\" (1.676 m)   Wt 219 lb (99.3 kg)   SpO2 97%   BMI 35.35 kg/m²     Physical Exam  Vitals and nursing note reviewed. Constitutional:       Appearance: Normal appearance. HENT:      Head: Normocephalic and atraumatic. Right Ear: Tympanic membrane, ear canal and external ear normal. No middle ear effusion. There is impacted cerumen. Tympanic membrane is not scarred, perforated, erythematous or retracted. Left Ear: Tympanic membrane, ear canal and external ear normal.  No middle ear effusion. There is impacted cerumen. Tympanic membrane is not scarred, perforated, erythematous or retracted. Ears:      Comments: Binocular microscopy revealed:    Bilateral EACs patent without edema erythema or lesions  Bilateral TMs intact with no perforations retractions or middle ear effusions       Nose: Nose normal. No septal deviation, congestion or rhinorrhea. Comments: Anterior rhinoscopy shows nasal septum at midline without perforation. Good visualization of the bilateral middle meatus status post endoscopic sinus. The maxillary and ethmoid sinuses with no mucopurulence or mucostasis and no acute inflammatory changes present. Mouth/Throat:      Mouth: Mucous membranes are moist.      Pharynx: Oropharynx is clear. No oropharyngeal exudate or posterior oropharyngeal erythema. Eyes:      General: No scleral icterus. Pulmonary:      Effort: Pulmonary effort is normal.   Musculoskeletal:      Cervical back: Normal range of motion and neck supple.  No rigidity. Lymphadenopathy:      Cervical: No cervical adenopathy. Skin:     General: Skin is warm and dry. Neurological:      Mental Status: She is alert and oriented to person, place, and time. Psychiatric:         Mood and Affect: Mood normal.         Behavior: Behavior normal.        Binocular microscopy exam reveals: cerumen impactions    PROCEDURE: Cerumen removal under binocular microscopy  INDICATIONS: Cerumen impaction  DESCRIPTION: After verbal consent was obtained and a timeout was performed, the otologic microscope was used to visualize both ears. There were normal appearing auricles bilaterally. There was cerumen impaction bilaterally. Ear cleaning performed under the scope w/ a right angle hook,alligator forceps, and maciel suctions. After cleaning, the ear canal skin was healthy and both TMs were intact w/ no perforations. Both middle ear spaces were clear w/ no effusions. The patient tolerated the procedure well and there were no complications. ASSESSMENT and PLAN        ICD-10-CM    1. Chronic rhinosinusitis  J31.0     J32.9       2. S/P functional endoscopic sinus surgery  Z98.890       3. Impacted cerumen, bilateral  H61.23           Patient had bilateral cerumen impactions debrided under microscopy revealing otherwise normal ear exam with the bilateral TMs intact no perforation retractions or middle ear effusions. Anterior rhinoscopy today shows widely patent middle meatus and evidence of prior sinus surgery at the areas of her maxillary and ethmoid sinuses. No evidence of mucopurulence/mucostasis or acute inflammatory changes. She has been reassured and she can continue with her sinonasal irrigations increased oral hydration and Mucinex as needed. Follow-up in 1 year for ear check.     Viraj Zimmerman DO  1/18/2023

## 2023-03-14 NOTE — ANESTHESIA PROCEDURE NOTES
Peripheral Block    Start time: 2/16/2022 8:38 AM  End time: 2/16/2022 9:42 AM  Performed by: Marquise Sherman MD  Authorized by: Marquise Sherman MD       Pre-procedure: Indications: at surgeon's request, post-op pain management and procedure for pain    Preanesthetic Checklist: patient identified, risks and benefits discussed, site marked, timeout performed, anesthesia consent given and patient being monitored    Timeout Time: 08:38 EST          Block Type:   Block Type:  Popliteal  Laterality:  Right  Monitoring:  Standard ASA monitoring, responsive to questions, continuous pulse ox, oxygen, frequent vital sign checks and heart rate  Injection Technique:  Single shot  Procedures: ultrasound guided and nerve stimulator    Patient position: Lateral Decubitus.   Prep: chlorhexidine    Location:  Lower thigh  Needle Type:  Stimuplex  Needle Gauge:  22 G  Needle Localization:  Nerve stimulator and ultrasound guidance  Motor Response comment:   Motor Response: minimal motor response >0.4 mA   Medication Injected:  Ropivacaine (PF) (NAROPIN)(0.5%) 5 mg/mL injection, 30 mL  Med Admin Time: 2/16/2022 8:42 AM    Assessment:  Number of attempts:  1  Injection Assessment:  Incremental injection every 5 mL, no paresthesia, ultrasound image on chart, local visualized surrounding nerve on ultrasound, negative aspiration for blood and no intravascular symptoms  Patient tolerance:  Patient tolerated the procedure well with no immediate complications
Peripheral Block    Start time: 2/16/2022 8:43 AM  End time: 2/16/2022 8:46 AM  Performed by: Tu Ocampo MD  Authorized by: Tu Ocampo MD       Pre-procedure: Indications: at surgeon's request, post-op pain management and procedure for pain    Preanesthetic Checklist: patient identified, risks and benefits discussed, site marked, timeout performed, anesthesia consent given and patient being monitored    Timeout Time: 08:43 EST          Block Type:   Block Type:   Adductor canal  Laterality:  Right  Monitoring:  Standard ASA monitoring, responsive to questions, oxygen, continuous pulse ox, frequent vital sign checks and heart rate  Injection Technique:  Single shot  Procedures: ultrasound guided    Patient Position: supine  Prep: chlorhexidine    Location:  Mid thigh  Needle Type:  Stimuplex  Needle Gauge:  22 G  Needle Localization:  Ultrasound guidance  Medication Injected:  Ropivacaine (PF) (NAROPIN)(0.5%) 5 mg/mL injection, 20 mL  Med Admin Time: 2/16/2022 8:46 AM    Assessment:  Number of attempts:  1  Injection Assessment:  Incremental injection every 5 mL, no paresthesia, ultrasound image on chart, no intravascular symptoms, negative aspiration for blood and local visualized surrounding nerve on ultrasound  Patient tolerance:  Patient tolerated the procedure well with no immediate complications
Never

## 2023-12-04 ENCOUNTER — PROCEDURE VISIT (OUTPATIENT)
Dept: AUDIOLOGY | Age: 78
End: 2023-12-04

## 2023-12-04 DIAGNOSIS — Z97.4 USES HEARING AID: Primary | ICD-10-CM

## 2023-12-04 PROCEDURE — V5014 HEARING AID REPAIR/MODIFYING: HCPCS | Performed by: AUDIOLOGIST

## 2023-12-04 NOTE — PROGRESS NOTES
Patient reports that her right hearing aid is not working. Cleaned and checked both aids. Changed the dome and wax filter on both aids. A listening check revealed that the left aid is functioning correctly at this time and that the right aid is dead. Sending the right aid to [facility] for repair out of warranty. The patient will be notified once the aid returns from repair.    Goyo Gayle, Francesca CCC-A

## 2023-12-11 ENCOUNTER — TELEPHONE (OUTPATIENT)
Dept: ENT CLINIC | Age: 78
End: 2023-12-11

## 2023-12-11 NOTE — TELEPHONE ENCOUNTER
Patient's hearing aid is back from repair. Right SN: [id number]. Warranty end date: 12/12/24. The patient can pick the aid up from the  in our [place] office.

## 2024-01-22 ENCOUNTER — OFFICE VISIT (OUTPATIENT)
Dept: ENT CLINIC | Age: 79
End: 2024-01-22

## 2024-01-22 VITALS
RESPIRATION RATE: 17 BRPM | HEART RATE: 76 BPM | OXYGEN SATURATION: 95 % | HEIGHT: 66 IN | BODY MASS INDEX: 35.74 KG/M2 | WEIGHT: 222.4 LBS

## 2024-01-22 DIAGNOSIS — J30.0 VASOMOTOR RHINITIS: ICD-10-CM

## 2024-01-22 DIAGNOSIS — H61.23 IMPACTED CERUMEN, BILATERAL: ICD-10-CM

## 2024-01-22 DIAGNOSIS — J32.9 CHRONIC RHINOSINUSITIS: Primary | ICD-10-CM

## 2024-01-22 DIAGNOSIS — Z98.890 S/P FESS (FUNCTIONAL ENDOSCOPIC SINUS SURGERY): ICD-10-CM

## 2024-01-22 RX ORDER — MESALAMINE 1.2 G/1
TABLET, DELAYED RELEASE ORAL
COMMUNITY
Start: 2023-11-27

## 2024-01-22 RX ORDER — IPRATROPIUM BROMIDE 21 UG/1
2 SPRAY, METERED NASAL 2 TIMES DAILY
Qty: 30 ML | Refills: 2 | Status: SHIPPED | OUTPATIENT
Start: 2024-01-22 | End: 2024-04-21

## 2024-01-22 RX ORDER — SODIUM FLUORIDE1.1%, POTASSIUM NITRATE 5% 5.8; 57.5 MG/ML; MG/ML
GEL, DENTIFRICE DENTAL
COMMUNITY
Start: 2024-01-09

## 2024-01-22 RX ORDER — POTASSIUM CHLORIDE 1500 MG/1
20 TABLET, EXTENDED RELEASE ORAL 2 TIMES DAILY
COMMUNITY
Start: 2023-11-03

## 2024-01-22 RX ORDER — RIFAXIMIN 550 MG/1
TABLET ORAL
COMMUNITY
Start: 2024-01-10

## 2024-01-22 RX ORDER — BUDESONIDE AND FORMOTEROL FUMARATE DIHYDRATE 160; 4.5 UG/1; UG/1
AEROSOL RESPIRATORY (INHALATION)
COMMUNITY
Start: 2023-10-18

## 2024-01-22 NOTE — PROGRESS NOTES
daily    azelastine (ASTELIN) 0.1 % nasal spray 1 spray by Nasal route 2 times daily as needed    vitamin D 25 MCG (1000 UT) CAPS Take by mouth    desloratadine (CLARINEX) 5 MG tablet Take 1 tablet by mouth    esomeprazole (NEXIUM) 40 MG delayed release capsule TAKE ONE CAPSULE BY MOUTH TWICE A DAY    fluticasone (FLONASE) 50 MCG/ACT nasal spray 2 times daily    hydroCHLOROthiazide (HYDRODIURIL) 25 MG tablet TAKE 1 TABLET EVERY DAY    levothyroxine (SYNTHROID) 200 MCG tablet Take 1 tablet by mouth every morning (before breakfast)    levothyroxine (SYNTHROID) 75 MCG tablet TAKE 1 TABLET EVERY MORNING BEFORE BREAKFAST    Magnesium 500 MG CAPS Take by mouth    metoprolol succinate (TOPROL XL) 50 MG extended release tablet Take 1 tablet by mouth daily    potassium chloride (KLOR-CON M) 20 MEQ extended release tablet Take 1 tablet by mouth 2 times daily    venlafaxine (EFFEXOR XR) 75 MG extended release capsule 3 capsules daily     No current facility-administered medications for this visit.       Past Medical History:   Diagnosis Date    Abnormal serum cholesterol     Allergic rhinitis     Ankle fracture, right 2022    Arthritis     bilat hands & toes    Asthma     inhaler daily and prn    Breast cancer (HCC) 2021    mastectomy- left     Breast cancer, left (HCC) 1998    lumpectomy left breast 1998 and radiation    Chronic knee pain 10/21/2016    Chronic sinusitis     COVID-19 vaccine series completed     pfizer, second does 2/2021- will bring card    Degenerative cervical spinal stenosis     Depression     depression - pt takes zoloft    Deviated nasal septum     Difficult intubation     Grade 3 view on DL - glidescope used successfully     Disturbance of salivary secretion     GERD (gastroesophageal reflux disease)     nexium twice daily, and 2 pillows    Hoarseness     Hypercholesterolemia     controlled with medication    Hyperlipidemia     Hypertension     controlled with medication    Hypokalemia 10/21/2016

## 2024-03-25 ENCOUNTER — OFFICE VISIT (OUTPATIENT)
Dept: ENT CLINIC | Age: 79
End: 2024-03-25
Payer: MEDICARE

## 2024-03-25 VITALS
BODY MASS INDEX: 34.07 KG/M2 | OXYGEN SATURATION: 96 % | HEIGHT: 66 IN | WEIGHT: 212 LBS | RESPIRATION RATE: 16 BRPM | HEART RATE: 69 BPM

## 2024-03-25 DIAGNOSIS — Z98.890 S/P FESS (FUNCTIONAL ENDOSCOPIC SINUS SURGERY): ICD-10-CM

## 2024-03-25 DIAGNOSIS — J34.89 NASAL MUCOSA DRY: ICD-10-CM

## 2024-03-25 DIAGNOSIS — J32.9 CHRONIC RHINOSINUSITIS: Primary | ICD-10-CM

## 2024-03-25 PROCEDURE — 1036F TOBACCO NON-USER: CPT | Performed by: STUDENT IN AN ORGANIZED HEALTH CARE EDUCATION/TRAINING PROGRAM

## 2024-03-25 PROCEDURE — G8417 CALC BMI ABV UP PARAM F/U: HCPCS | Performed by: STUDENT IN AN ORGANIZED HEALTH CARE EDUCATION/TRAINING PROGRAM

## 2024-03-25 PROCEDURE — 99213 OFFICE O/P EST LOW 20 MIN: CPT | Performed by: STUDENT IN AN ORGANIZED HEALTH CARE EDUCATION/TRAINING PROGRAM

## 2024-03-25 PROCEDURE — G8427 DOCREV CUR MEDS BY ELIG CLIN: HCPCS | Performed by: STUDENT IN AN ORGANIZED HEALTH CARE EDUCATION/TRAINING PROGRAM

## 2024-03-25 PROCEDURE — 31231 NASAL ENDOSCOPY DX: CPT | Performed by: STUDENT IN AN ORGANIZED HEALTH CARE EDUCATION/TRAINING PROGRAM

## 2024-03-25 PROCEDURE — 1090F PRES/ABSN URINE INCON ASSESS: CPT | Performed by: STUDENT IN AN ORGANIZED HEALTH CARE EDUCATION/TRAINING PROGRAM

## 2024-03-25 PROCEDURE — G8484 FLU IMMUNIZE NO ADMIN: HCPCS | Performed by: STUDENT IN AN ORGANIZED HEALTH CARE EDUCATION/TRAINING PROGRAM

## 2024-03-25 PROCEDURE — 1123F ACP DISCUSS/DSCN MKR DOCD: CPT | Performed by: STUDENT IN AN ORGANIZED HEALTH CARE EDUCATION/TRAINING PROGRAM

## 2024-03-25 PROCEDURE — G8400 PT W/DXA NO RESULTS DOC: HCPCS | Performed by: STUDENT IN AN ORGANIZED HEALTH CARE EDUCATION/TRAINING PROGRAM

## 2024-03-25 ASSESSMENT — ENCOUNTER SYMPTOMS
NAUSEA: 0
SHORTNESS OF BREATH: 0
STRIDOR: 0
WHEEZING: 0
EYE ITCHING: 0
FACIAL SWELLING: 0
CONSTIPATION: 0
EYE PAIN: 0
SINUS PRESSURE: 0
APNEA: 0
SINUS PAIN: 0
CHOKING: 0
EYE DISCHARGE: 0
COUGH: 0
DIARRHEA: 0

## 2024-03-25 NOTE — PROGRESS NOTES
HPI:  Sheila Clinton is a 78 y.o. female seen Established   Chief Complaint   Patient presents with    Follow-up     Patient presents today for 2 MO sinus recheck and cerumen removal. Patient states that she has noticed great improvement with treatment.      78-year-old female seen for a follow-up evaluation history of CRS status post FESS by Dr. Aponte many years ago.  She had acute on chronic sinusitis with evident right-sided OMC and maxillary sinusitis on office exam with nasal endoscopy 2 months ago.  She had had recent antibiotics and also in the setting of numerous antibiotic allergies she was recommended topical medicated rinses.  She has been on mupirocin/budesonide irrigations with significant symptomatic improvement.  She has now discontinued on using her Flonase and saline.  She feels back to baseline at this point.      Past Medical History, Past Surgical History, Family history, Social History, and Medications were all reviewed with the patient today and updated as necessary.     Allergies   Allergen Reactions    Cefaclor Diarrhea and Hives     IV OK  Other reaction(s): Hives/Swelling-Allergy  Other reaction(s): hives    Cefuroxime Axetil Diarrhea and Hives     IV OK  Other reaction(s): Hives/Swelling-Allergy    Ciprofloxacin Hcl Hives     Other reaction(s): Hives/Swelling-Allergy    Clarithromycin Diarrhea and Hives     IV is OK  Other reaction(s): Hives/Swelling-Allergy  Other reaction(s): hives, Hives/Swelling-Allergy  IV is OK  Other reaction(s): Hives/Swelling-Allergy  IV is OK  Other reaction(s): Hives/Swelling-Allergy    Other reaction(s): hives    Fluconazole Diarrhea and Hives     Other reaction(s): Hives/Swelling-Allergy  Other reaction(s): hives  Other reaction(s): Hives/Swelling-Allergy  Other reaction(s): Hives/Swelling-Allergy    Other reaction(s): hives    Moxifloxacin Diarrhea and Hives     IV is OK  Other reaction(s): Hives/Swelling-Allergy  Other reaction(s): hives,

## 2024-06-01 ENCOUNTER — HOSPITAL ENCOUNTER (OUTPATIENT)
Dept: MAMMOGRAPHY | Age: 79
End: 2024-06-01
Payer: MEDICARE

## 2024-06-01 DIAGNOSIS — Z12.39 BREAST SCREENING: ICD-10-CM

## 2024-06-01 PROCEDURE — 77063 BREAST TOMOSYNTHESIS BI: CPT

## 2024-08-26 ENCOUNTER — OFFICE VISIT (OUTPATIENT)
Dept: ENT CLINIC | Age: 79
End: 2024-08-26
Payer: MEDICARE

## 2024-08-26 VITALS
HEART RATE: 59 BPM | WEIGHT: 204 LBS | OXYGEN SATURATION: 97 % | BODY MASS INDEX: 32.78 KG/M2 | HEIGHT: 66 IN | RESPIRATION RATE: 16 BRPM

## 2024-08-26 DIAGNOSIS — H61.23 IMPACTED CERUMEN, BILATERAL: Primary | ICD-10-CM

## 2024-08-26 PROCEDURE — 69210 REMOVE IMPACTED EAR WAX UNI: CPT | Performed by: STUDENT IN AN ORGANIZED HEALTH CARE EDUCATION/TRAINING PROGRAM

## 2024-08-26 NOTE — PROGRESS NOTES
HPI:  Sheila Clinton is a 79 y.o. female seen   Chief Complaint   Patient presents with    Cerumen Impaction     Patient presents today for 5 MO cerumen removal and ear exam .        Here for a follow-up evaluation ear exam with history of cerumen.  Sinuses have been stable since her last follow-up earlier this year.  She has not needed her mupirocin/budesonide irrigations lately but has them available to use as needed in the future.  There has been a gradual onset of bilateral  ear fullness that has been persistent for months. The course has been increasing and has been moderate in degree. The ear fullness has been without the new onset of tinnitus, recurrent otitis media, otalgia, otorrhea or vertigo. The symptoms are described as being in both ears.     Past Medical History, Past Surgical History, Family history, Social History, and Medications were all reviewed with the patient today and updated as necessary.     Allergies   Allergen Reactions    Cefaclor Diarrhea and Hives     IV OK  Other reaction(s): Hives/Swelling-Allergy  Other reaction(s): hives    Cefuroxime Axetil Diarrhea and Hives     IV OK  Other reaction(s): Hives/Swelling-Allergy    Ciprofloxacin Hcl Hives     Other reaction(s): Hives/Swelling-Allergy    Clarithromycin Diarrhea and Hives     IV is OK  Other reaction(s): Hives/Swelling-Allergy  Other reaction(s): hives, Hives/Swelling-Allergy  IV is OK  Other reaction(s): Hives/Swelling-Allergy  IV is OK  Other reaction(s): Hives/Swelling-Allergy    Other reaction(s): hives    Fluconazole Diarrhea and Hives     Other reaction(s): Hives/Swelling-Allergy  Other reaction(s): hives  Other reaction(s): Hives/Swelling-Allergy  Other reaction(s): Hives/Swelling-Allergy    Other reaction(s): hives    Moxifloxacin Diarrhea and Hives     IV is OK  Other reaction(s): Hives/Swelling-Allergy  Other reaction(s): hives, Hives/Swelling-Allergy  IV is OK  Other reaction(s): Hives/Swelling-Allergy  IV is OK  Other

## 2024-08-27 ENCOUNTER — HOSPITAL ENCOUNTER (OUTPATIENT)
Dept: NUTRITION | Age: 79
Setting detail: RECURRING SERIES
Discharge: HOME | End: 2024-08-30

## 2024-08-27 PROCEDURE — 97802 MEDICAL NUTRITION INDIV IN: CPT

## 2024-08-27 NOTE — PROGRESS NOTES
Radha Trevino, MS, RD, LD  Outpatient Registered Dietitian  [facility]  Phone: [phone]  Fax: [phone]    Sheila Clinton is a 79 y.o. female referred with the following diagnosis (es): Irritable bowel syndrome with diarrhea [K58.0]     ASSESSMENT  PMH includes HTN, HLD, GERD, hypothyroid, asthma   Labs: 3/6/2024 Calprotectin 225 (H), pancreatic elastase 141 (L) 5/3/2024 TSH 0.064 (L) T4 free 1.94 (H),  (H)   Meds: conestipol, budesonide, Zenpep, metamucil 2x daily    Patient stated goal: wants to improve her diet and GI symptoms    Patient referred from GI for help with IBS specific diet, low FODMAP, avoid caffeine.   States since on budesonide, bowel movements have much improved. Was afraid of leaving home due to symptoms. Has had 20# unintentional weight loss over the last 7 months due to fear of eating, frequency of stool and poor appetite.   Has referral to pelvic floor PT.    She avoids sugary foods and sugar substitutes.     Food Allergies:  Lactose, carrots, pork, shellfish, no seafood     GI Symptoms:  -1-2 bowel movements daily, Lilly type 4, typically in AM after breakfast and in afternoon  -a lot of gas in afternoon & evening  -does not endorse significant bloating or abdominal pain     Factors impacting food choices:   -Established food preferences/eating behaviors  -Confusion on nutrition advice  -Altered GI structure/function    Initial Diet Recall:   B: honey nut cheerios, banana, oat milk, coffee, stevia   L: slider bun with turkey, cheese, lite oreilly, water, apple   D: chicken & garlic noodles   Beverages: 30-40oz of water     Eating pattern appears inadequate in fiber, fruit, and vegetables.    Lifestyle/Family Influence/Support:   -lives with spouse     Movement includes none currently, hopes to get back to the gym soon.    Stage of Change: Preparation.    Anthropometrics:   Estimated body mass index is 32.93 kg/m² as calculated from the

## 2024-09-12 NOTE — PROGRESS NOTES
Sheila Clinton  : 1945  Primary: Medicare Part A And B (Medicare)  Secondary: MCKENZIE TOMPKINS [facility] @ [address]  [place] [address]  Phone: [phone]  Fax: [phone] Plan Frequency: 1x/week for 90 days  Plan of Care/Certification Expiration Date: 01/10/25        Plan of Care/Certification Expiration Date:  Plan of Care/Certification Expiration Date: 01/10/25    Frequency/Duration: Plan Frequency: 1x/week for 90 days      Time In/Out:   Time In: 1300  Time Out: 1350      PT Visit Info:    Progress Note Due Date: 11/10/24      Visit Count:  1    OUTPATIENT PHYSICAL THERAPY:   Treatment Note 10/10/2024       Episode  (PFPT)               Treatment Diagnosis:    Lack of coordination  Fecal smearing  Urge incontinence of urine  Medical/Referring Diagnosis:    Fecal smearing  PFD (pelvic floor dysfunction)      Referring Physician:  Susanne Francis PA-C MD Orders:  PT Eval and Treat   Return MD Appt:     Date of Onset:  No data recorded   Allergies:   Cefaclor, Cefuroxime axetil, Ciprofloxacin hcl, Clarithromycin, Fluconazole, Moxifloxacin, Oxycodone, Penicillins, Shellfish allergy, Cheese, Inositol niacinate, Lactose, Niacin, Other, Carrot, Cefdinir, Cefuroxime, Ciprofloxacin, Penicillin g benzathine, Pork allergy, and Cephalexin  Restrictions/Precautions:   None      Interventions Planned (Treatment may consist of any combination of the following):     See Assessment Note    Subjective Comments:     Initial Pain Level::     0/10  Post Session Pain Level:       0/10  Medications Last Reviewed:  10/10/2024  Updated Objective Findings:  See Evaluation Note from today  Treatment   THERAPEUTIC ACTIVITY: ( 25 minutes): Functional activity education regarding anatomy, pathology and role of pelvic floor muscle (PFM) function in relation to presenting symptoms and role of pelvic floor therapy in conservative treatment. and Instruction on

## 2024-09-12 NOTE — THERAPY EVALUATION
Sheila Clinton  : 1945  Primary: Medicare Part A And B (Medicare)  Secondary: MCKENZIE TOMPKINS [facility] @ [address]  [facility] [address]  Phone: [phone]  Fax: [phone] Plan Frequency: 1x/week for 90 days    Plan of Care/Certification Expiration Date: 01/10/25        Plan of Care/Certification Expiration Date:  Plan of Care/Certification Expiration Date: 01/10/25    Frequency/Duration: Plan Frequency: 1x/week for 90 days      Time In/Out:   Time In: 1300  Time Out: 1350    PT Visit Info:    Plan Frequency: 1x/week for 90 days  Progress Note Due Date: 11/10/24      Visit Count:  1                OUTPATIENT PHYSICAL THERAPY:             Initial Assessment 10/10/2024               Episode (PFPT)         Treatment Diagnosis:     Lack of coordination  Fecal smearing  Urge incontinence of urine  Contributing Diagnosis:  Fecal smearing (Fecal soiling) (R15.1), Fecal urgency (R15.2), Pelvic floor dysfunction in female (M62.98), and Urgency of urination (R39.15)  Medical/Referring Diagnosis:    Fecal smearing  PFD (pelvic floor dysfunction)        Referring Physician:  Susanne Francis PA-C MD Orders:  PT Eval and Treat   Return MD Appt:    Date of Onset:      Allergies:  Cefaclor, Cefuroxime axetil, Ciprofloxacin hcl, Clarithromycin, Fluconazole, Moxifloxacin, Oxycodone, Penicillins, Shellfish allergy, Cheese, Inositol niacinate, Lactose, Niacin, Other, Carrot, Cefdinir, Cefuroxime, Ciprofloxacin, Penicillin g benzathine, Pork allergy, and Cephalexin  Restrictions/Precautions:    None      Medications Last Reviewed:  10/10/2024     SUBJECTIVE   History of Injury/Illness (Reason for Referral):  Ms. Clinton is a 78 yo female referred to pelvic floor physical therapy (PFPT) by Susanne Francis* 2/2 PFD (pelvic floor dysfunction) [M62.89]  Fecal smearing [R15.1].    She reports that she was having stool incontinence but this has

## 2024-09-19 ENCOUNTER — HOSPITAL ENCOUNTER (OUTPATIENT)
Dept: NUTRITION | Age: 79
Setting detail: RECURRING SERIES
Discharge: HOME | End: 2024-09-22

## 2024-09-19 PROCEDURE — 97803 MED NUTRITION INDIV SUBSEQ: CPT

## 2024-10-10 ENCOUNTER — HOSPITAL ENCOUNTER (OUTPATIENT)
Dept: PHYSICAL THERAPY | Age: 79
Setting detail: RECURRING SERIES
Discharge: HOME | End: 2024-10-13
Payer: MEDICARE

## 2024-10-10 DIAGNOSIS — R27.9 LACK OF COORDINATION: Primary | ICD-10-CM

## 2024-10-10 DIAGNOSIS — N39.41 URGE INCONTINENCE OF URINE: ICD-10-CM

## 2024-10-10 DIAGNOSIS — R15.1 FECAL SMEARING: ICD-10-CM

## 2024-10-10 PROCEDURE — 97161 PT EVAL LOW COMPLEX 20 MIN: CPT

## 2024-10-10 PROCEDURE — 97530 THERAPEUTIC ACTIVITIES: CPT

## 2024-10-10 ASSESSMENT — PAIN SCALES - GENERAL: PAINLEVEL_OUTOF10: 0

## 2024-10-14 NOTE — OP NOTES
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 Procedure(s):  LEFT BREAST TISSUE EXPANDER IMPLANT EXCHANGE (FROM EXPANDER TO IMPLANT)  RIGHT BREAST REDUCTION WITH MASTOPEXY    Prior to procedure informed consent obtained from patient follow discussion of risks, benefits and alternatives including but not limited to asymmetry, deformity, pain, need for further surgery, infection, bleeding, tissue loss. Wise pattern reduction marked in holding with patient in the upright position marking the midline, breast meridian, lateral and medial breast margins and new NAC position at the at the level of the IMF    Patient was brought to the OR. General anesthesia induced. Surgical timeout performed. Previous marks reinforced. 100 ml of 0.25% Lidocaine with epi infiltrated on the left.       Lateral marked incision made full thickness and carried down to expander. This was removed and displaced. Capsule examined and without overt issues but more tough consistent with previous radiation. Circumferential and grid capsulotomies performed releasing the left IMF and soft tissue. Patient redraped and using no touch technique new implant placed. Closure completed with 3-0 vicryl at the fascia, 3-0 monocryl and stratafix at the surface. Attention then turned to the right breast. All of the marked incisions were scored and the pedicle de-epithelialized. Incisions then made full thickness. Central mound inferior pedicle developed sharply to the chest wall. Lateral and then medial flap thinned sharply.      Keyhole then sharply excised. Specimen passed off the field.  Pedicle then trimmed taking care to maintain lateral chest wall tissue as well as NAC vascularity. Wound irrigated and hemostasis obtained with electrocautery. Temporary closure obtained with staples. Patient was positioned upright on the OR table and volume/symmetry re-assessed and found to be excellent. Staples were removed and again surgical field irrigated with warm NS prior to closure. Hemostasis obtained with cautery.      Closure was completed on the right with: intermittent 3-0 vicryls at the inferior T and horizontal, cardinal points of the NAC. Running deep 3-0 vicryl at the IMF. Running 3-0 monoderm quill intracuticular completed the closure. NAC perfusion re-assessed following closure and without immediate compromise.     Formal breast dressing applied over xeroform on incisions. Surgeon(s):  Jennifer Kaufman MD    Surgical Assistant: Surg Asst-1: Bessie Hernandez    Anesthesia: General     Estimated Blood Loss (mL): less than 683     Complications: None    Specimens:   ID Type Source Tests Collected by Time Destination   1 : right breast tissue Fresh Breast  MoistEsdras collazo MD 8/31/2021 1009 Pathology        Implants:   Implant Name Type Inv.  Item Serial No.  Lot No. LRB No. Used Action   IMPLANT BRST 275CC DIA10.8CM P4.4CM ALANNA GEL SMOOTH RND HI - [id number]  IMPLANT BRST 275CC DIA10.8CM P4.4CM ALANNA GEL SMOOTH RND [place] [id number] [employer]WD [id number] Left 1 Implanted       Drains: * No LDAs found * no

## 2024-10-15 ENCOUNTER — HOSPITAL ENCOUNTER (OUTPATIENT)
Dept: PHYSICAL THERAPY | Age: 79
Setting detail: RECURRING SERIES
Discharge: HOME | End: 2024-10-18
Payer: MEDICARE

## 2024-10-15 PROCEDURE — 97110 THERAPEUTIC EXERCISES: CPT

## 2024-10-15 PROCEDURE — 97530 THERAPEUTIC ACTIVITIES: CPT

## 2024-10-15 ASSESSMENT — PAIN SCALES - GENERAL: PAINLEVEL_OUTOF10: 0

## 2024-10-15 NOTE — PROGRESS NOTES
Sheila Clinton  : 1945  Primary: Medicare Part A And B (Medicare)  Secondary: MCKENZIE TOMPKINS [facility] @ [address]  [facility] [address]  Phone: [phone]  Fax: [phone] Plan Frequency: 1x/week for 90 days  Plan of Care/Certification Expiration Date: 01/10/25        Plan of Care/Certification Expiration Date:  Plan of Care/Certification Expiration Date: 01/10/25    Frequency/Duration: Plan Frequency: 1x/week for 90 days      Time In/Out:   Time In: 1454  Time Out: 1547      PT Visit Info:    Progress Note Due Date: 11/10/24      Visit Count:  2    OUTPATIENT PHYSICAL THERAPY:   Treatment Note 10/15/2024       Episode  (PFPT)               Treatment Diagnosis:    No data found  Lack of coordination  Fecal smearing  Urge incontinence of urine  Contributing Diagnosis:  Fecal smearing (Fecal soiling) (R15.1), Fecal urgency (R15.2), Pelvic floor dysfunction in female (M62.98), and Urgency of urination (R39.15)  Medical/Referring Diagnosis:    Fecal smearing  PFD (pelvic floor dysfunction)      Referring Physician:  Susanne Francis PA-C MD Orders:  PT Eval and Treat   Return MD Appt:     Date of Onset:  No data recorded   Allergies:   Cefaclor, Cefuroxime axetil, Ciprofloxacin hcl, Clarithromycin, Fluconazole, Moxifloxacin, Oxycodone, Penicillins, Shellfish allergy, Cheese, Inositol niacinate, Lactose, Niacin, Other, Carrot, Cefdinir, Cefuroxime, Ciprofloxacin, Penicillin g benzathine, Pork allergy, and Cephalexin  Restrictions/Precautions:   None      Interventions Planned (Treatment may consist of any combination of the following):     See Assessment Note    Subjective Comments:      She reports that she was having stool incontinence but this has resolved for the most part. She does have stool leakage with coughing and sneezing, this is not every day. She describes this as a soft smearing.   She does have control over her bowels when she

## 2024-10-22 ENCOUNTER — HOSPITAL ENCOUNTER (OUTPATIENT)
Dept: PHYSICAL THERAPY | Age: 79
Setting detail: RECURRING SERIES
Discharge: HOME | End: 2024-10-25
Payer: MEDICARE

## 2024-10-22 PROCEDURE — 97110 THERAPEUTIC EXERCISES: CPT

## 2024-10-22 PROCEDURE — 97530 THERAPEUTIC ACTIVITIES: CPT

## 2024-10-22 ASSESSMENT — PAIN SCALES - GENERAL: PAINLEVEL_OUTOF10: 0

## 2024-10-22 NOTE — PROGRESS NOTES
Sheila Clinton  : 1945  Primary: Medicare Part A And B (Medicare)  Secondary: MCKENZIE TOMPKINS [facility] @ [address]  [facility] [address]  Phone: [phone]  Fax: [phone] Plan Frequency: 1x/week for 90 days  Plan of Care/Certification Expiration Date: 01/10/25        Plan of Care/Certification Expiration Date:  Plan of Care/Certification Expiration Date: 01/10/25    Frequency/Duration: Plan Frequency: 1x/week for 90 days      Time In/Out:   Time In: 1501  Time Out: 1550      PT Visit Info:    Progress Note Due Date: 11/10/24      Visit Count:  3    OUTPATIENT PHYSICAL THERAPY:   Treatment Note 10/22/2024       Episode  (PFPT)               Treatment Diagnosis:    No data found  Lack of coordination  Fecal smearing  Urge incontinence of urine  Contributing Diagnosis:  Fecal smearing (Fecal soiling) (R15.1), Fecal urgency (R15.2), Pelvic floor dysfunction in female (M62.98), and Urgency of urination (R39.15)  Medical/Referring Diagnosis:    Fecal smearing  PFD (pelvic floor dysfunction)      Referring Physician:  Susanne Francis PA-C MD Orders:  PT Eval and Treat   Return MD Appt:     Date of Onset:  No data recorded   Allergies:   Cefaclor, Cefuroxime axetil, Ciprofloxacin hcl, Clarithromycin, Fluconazole, Moxifloxacin, Oxycodone, Penicillins, Shellfish allergy, Cheese, Inositol niacinate, Lactose, Niacin, Other, Carrot, Cefdinir, Cefuroxime, Ciprofloxacin, Penicillin g benzathine, Pork allergy, and Cephalexin  Restrictions/Precautions:   None      Interventions Planned (Treatment may consist of any combination of the following):     See Assessment Note    Subjective Comments:      She reports that she was having stool incontinence but this has resolved for the most part. She does have stool leakage with coughing and sneezing, this is not every day. She describes this as a soft smearing.   She does have control over her bowels when she

## 2024-10-31 ENCOUNTER — APPOINTMENT (OUTPATIENT)
Dept: PHYSICAL THERAPY | Age: 79
End: 2024-10-31
Payer: MEDICARE

## 2024-11-05 ENCOUNTER — HOSPITAL ENCOUNTER (OUTPATIENT)
Dept: PHYSICAL THERAPY | Age: 79
Setting detail: RECURRING SERIES
Discharge: HOME | End: 2024-11-08
Payer: MEDICARE

## 2024-11-05 PROCEDURE — 97530 THERAPEUTIC ACTIVITIES: CPT

## 2024-11-05 PROCEDURE — 97110 THERAPEUTIC EXERCISES: CPT

## 2024-11-05 ASSESSMENT — PAIN SCALES - GENERAL: PAINLEVEL_OUTOF10: 0

## 2024-11-05 NOTE — PROGRESS NOTES
rectally:    Contract-relax     Drops    Kegels with exhale Tactile cues vaginally     Quick flicks     Kegels with exhale   Supine adduction    3x10 with cues for inhale relax exhale and squeeze    Bridge    GTB 3x10 with max cues for exhale and avoidance of bearing down   Supine abd    GTB 3x10    Sit to stand   X10 with mod/max cues for breath    clam   No band x 20 each side - max cues for avoidance of hip roll      MANUAL THERAPY: ( minutes):   Soft tissue mobilization was utilized and necessary because of the patient's painful spasm and restricted motion of soft tissue.   Date Type Location Comments   11/5/2024 Internal assessment/treatment Via vaginal canal completed                                       (Used abbreviations: MET - muscle energy technique; SCS- Strain counter strain; CTM-Connective tissue mobilizations; CR- Contract/Relax; SP- Sustained pressure; PIT- Positional inhibition techniques; STM Soft -tissue mobilization; MM- Myofascial mobilization; TrP-Trigger point release; IASTM- Instrument assisted soft tissue mobilizations, TDN-Trigger point dry needling)    Pt gives verbal consent to internal vaginal assessment/treatment without chaperon present.  Access Code: [email or web address]  URL: [email or web address]  Date: 11/05/2024  Prepared by: Cecille Martin    Exercises  - inhale relax, exhale gentle squeeze  - 1 x daily - 7 x weekly - 2 sets - 20 reps  - Clamshell  - 1 x daily - 7 x weekly - 3 sets - 10 reps  - Bridge with Hip Abduction and Resistance  - 1 x daily - 7 x weekly - 3 sets - 10 reps  - Sit to Stand  - 1 x daily - 7 x weekly - 3 sets - 10 reps  - Hooklying Clamshell with Resistance  - 1 x daily - 7 x weekly - 3 sets - 10 reps    Treatment/Session Summary:    Treatment Assessment:   Pt reports good understanding of plan of care, as well as prescribed home exercise program. All questions were answered to pts satisfaction. Pt was invited to call with any further questions or

## 2024-11-21 ENCOUNTER — HOSPITAL ENCOUNTER (OUTPATIENT)
Dept: PHYSICAL THERAPY | Age: 79
Setting detail: RECURRING SERIES
Discharge: HOME | End: 2024-11-24
Payer: MEDICARE

## 2024-11-21 PROCEDURE — 97110 THERAPEUTIC EXERCISES: CPT

## 2024-11-21 ASSESSMENT — PAIN SCALES - GENERAL: PAINLEVEL_OUTOF10: 0

## 2024-11-21 NOTE — PROGRESS NOTES
Sheila Clinton  : 1945  Primary: Medicare Part A And B (Medicare)  Secondary: [facility] @ [address]  [facility] [address]  Phone: [phone]  Fax: [phone] Plan Frequency: 1x/week for 90 days  Plan of Care/Certification Expiration Date: 01/10/25        Plan of Care/Certification Expiration Date:  Plan of Care/Certification Expiration Date: 01/10/25    Frequency/Duration: Plan Frequency: 1x/week for 90 days      Time In/Out:   Time In: 1400  Time Out: 1449      PT Visit Info:    Progress Note Due Date: 11/10/24      Visit Count:  5    OUTPATIENT PHYSICAL THERAPY:   Treatment Note 2024       Episode  (PFPT)               Treatment Diagnosis:    No data found  Lack of coordination  Fecal smearing  Urge incontinence of urine  Contributing Diagnosis:  Fecal smearing (Fecal soiling) (R15.1), Fecal urgency (R15.2), Pelvic floor dysfunction in female (M62.98), and Urgency of urination (R39.15)  Medical/Referring Diagnosis:    Fecal smearing  PFD (pelvic floor dysfunction)      Referring Physician:  Susanne Francis PA-C MD Orders:  PT Eval and Treat   Return MD Appt:     Date of Onset:  No data recorded   Allergies:   Cefaclor, Cefuroxime axetil, Ciprofloxacin hcl, Clarithromycin, Fluconazole, Moxifloxacin, Oxycodone, Penicillins, Shellfish allergy, Cheese, Inositol niacinate, Lactose, Niacin, Other, Carrot, Cefdinir, Cefuroxime, Ciprofloxacin, Penicillin g benzathine, Pork allergy, and Cephalexin  Restrictions/Precautions:   None      Interventions Planned (Treatment may consist of any combination of the following):     See Assessment Note    Subjective Comments:      She reports that she was having stool incontinence but this has resolved for the most part. She does have stool leakage with coughing and sneezing, this is not every day. She describes this as a soft smearing.   She does have control over her bowels when she has an

## 2024-11-26 ENCOUNTER — HOSPITAL ENCOUNTER (OUTPATIENT)
Dept: PHYSICAL THERAPY | Age: 79
Setting detail: RECURRING SERIES
Discharge: HOME | End: 2024-11-29
Payer: MEDICARE

## 2024-11-26 PROCEDURE — 97110 THERAPEUTIC EXERCISES: CPT

## 2024-11-26 ASSESSMENT — PAIN SCALES - GENERAL: PAINLEVEL_OUTOF10: 0

## 2024-11-26 NOTE — PROGRESS NOTES
Sheila Clinton  : 1945  Primary: Medicare Part A And B (Medicare)  Secondary: [facility] @ [address]  [facility] [address]  Phone: [phone]  Fax: [phone] Plan Frequency: 1x/week for 90 days  Plan of Care/Certification Expiration Date: 01/10/25        Plan of Care/Certification Expiration Date:  Plan of Care/Certification Expiration Date: 01/10/25    Frequency/Duration: Plan Frequency: 1x/week for 90 days      Time In/Out:   Time In: 1102  Time Out: 1149      PT Visit Info:    Progress Note Due Date: 11/10/24      Visit Count:  6    OUTPATIENT PHYSICAL THERAPY:   Treatment Note 2024       Episode  (PFPT)               Treatment Diagnosis:    No data found  Lack of coordination  Fecal smearing  Urge incontinence of urine  Contributing Diagnosis:  Fecal smearing (Fecal soiling) (R15.1), Fecal urgency (R15.2), Pelvic floor dysfunction in female (M62.98), and Urgency of urination (R39.15)  Medical/Referring Diagnosis:    Fecal smearing  PFD (pelvic floor dysfunction)      Referring Physician:  Susanne Francis PA-C MD Orders:  PT Eval and Treat   Return MD Appt:     Date of Onset:  No data recorded   Allergies:   Cefaclor, Cefuroxime axetil, Ciprofloxacin hcl, Clarithromycin, Fluconazole, Moxifloxacin, Oxycodone, Penicillins, Shellfish allergy, Cheese, Inositol niacinate, Lactose, Niacin, Other, Carrot, Cefdinir, Cefuroxime, Ciprofloxacin, Penicillin g benzathine, Pork allergy, and Cephalexin  Restrictions/Precautions:   None      Interventions Planned (Treatment may consist of any combination of the following):     See Assessment Note    Subjective Comments:      She reports that she was having stool incontinence but this has resolved for the most part. She does have stool leakage with coughing and sneezing, this is not every day. She describes this as a soft smearing.   She does have control over her bowels when she has an

## 2024-12-03 ENCOUNTER — HOSPITAL ENCOUNTER (OUTPATIENT)
Dept: PHYSICAL THERAPY | Age: 79
Setting detail: RECURRING SERIES
Discharge: HOME | End: 2024-12-06
Payer: MEDICARE

## 2024-12-03 PROCEDURE — 97110 THERAPEUTIC EXERCISES: CPT

## 2024-12-03 ASSESSMENT — PAIN SCALES - GENERAL: PAINLEVEL_OUTOF10: 0

## 2024-12-03 NOTE — PROGRESS NOTES
was utilized and necessary because of the patient's painful spasm and restricted motion of soft tissue.   Date Type Location Comments   12/3/2024 Internal assessment/treatment Via vaginal canal Not today                                        (Used abbreviations: MET - muscle energy technique; SCS- Strain counter strain; CTM-Connective tissue mobilizations; CR- Contract/Relax; SP- Sustained pressure; PIT- Positional inhibition techniques; STM Soft -tissue mobilization; MM- Myofascial mobilization; TrP-Trigger point release; IASTM- Instrument assisted soft tissue mobilizations, TDN-Trigger point dry needling)    Pt gives verbal consent to internal vaginal assessment/treatment without chaperon present.        Treatment/Session Summary:    Treatment Assessment:   Pt reports good understanding of plan of care, as well as prescribed home exercise program. All questions were answered to pts satisfaction. Pt was invited to call with any further questions or concerns.   Patient improving in ability to properly coordinate exercises today.    Communication/Consultation:  None today  Equipment provided today:  None  Recommendations/Intent for next treatment session: Next visit will focus on   2. Coordination training   5. Rectal exam     >Total Treatment Billable Duration:  50 minutes   Time In: 1455  Time Out: 1545    Cecille Martin PT         Charge Capture  Events  [employer] Portal  Appt Desk  Attendance Report     Future Appointments   Date Time Provider Department Center   12/12/2024  3:00 PM Cecille Martin PT [facility]   12/17/2024  3:00 PM Cecille Martin PT [facility]   12/31/2024 11:00 AM Cecille Martin PT [facility]   1/28/2025  2:00 PM Florin Luna DO [facility]

## 2024-12-12 ENCOUNTER — HOSPITAL ENCOUNTER (OUTPATIENT)
Dept: PHYSICAL THERAPY | Age: 79
Setting detail: RECURRING SERIES
Discharge: HOME | End: 2024-12-15
Payer: MEDICARE

## 2024-12-12 PROCEDURE — 97110 THERAPEUTIC EXERCISES: CPT

## 2024-12-12 ASSESSMENT — PAIN SCALES - GENERAL: PAINLEVEL_OUTOF10: 0

## 2024-12-12 NOTE — PROGRESS NOTES
role of pelvic floor muscle (PFM) function in relation to presenting symptoms and role of pelvic floor therapy in conservative treatment. and Instruction on coordinated pelvic floor and diaphragmatic breathing to improve kinesthetic awareness of pelvic muscle mobility and restore proper motor recruitment patterns with breathing, posture, and functional movement (e.g. appropriate lift/contraction with increased IAP such as a cough, laugh, sneeze to prevent incontinence as well as appropriate relaxation/drop to reduce pain with vaginal penetration).    TA Educational Topic Notes Date Completed   Pathology/Anatomy/PFM Function Reviewed  10/10/24   Bladder health education     Urinary urge suppression Reviewed with patient - handout provided. Emphasis on timed voiding every 2-3 hours - pt tending to go 4+ hours between voids.     reviewed 10/15/24            11/5/24   The knack     Voiding strategies     Nocturia tips     Bowel/Bladder log     Bowel health education Reviewed with patient - fecal incontinence handout. Thickening foods, water intake     Reviewed the BRAT diet with patient today 10/22/24          11/26/24   Constipation care     Diarrhea/Fecal leakage     Colonic massage Reviewed with patient  10/22/24   Toilet positioning Reviewed with patient - discussed trying to have a BM later in the day if she starts to feel rectal pressure again  10/22/24   Defecation dynamics     Sources of fiber     Return to intercourse/vaginal trainers/wand     Perineal massage     Sexual positioning     Lubricants/vaginal moisturizers     Vulvovaginal health/vaginal irritants     Body mechanics     Posture/ergonomics     Diaphragmatic breathing Reviewed and practiced with and without kegel on exhale     Reviewed  10/9/24  10/15/24    10/22/24   Pain science education     Resources and technology     Other patient education Kegel HEP - quick flicks 3x10     HEP review  11/5/24 11/21/24     THERAPEUTIC EXERCISE: ( 14

## 2024-12-17 ENCOUNTER — APPOINTMENT (OUTPATIENT)
Dept: PHYSICAL THERAPY | Age: 79
End: 2024-12-17
Payer: MEDICARE

## 2024-12-26 ENCOUNTER — APPOINTMENT (OUTPATIENT)
Dept: PHYSICAL THERAPY | Age: 79
End: 2024-12-26
Payer: MEDICARE

## 2024-12-31 ENCOUNTER — HOSPITAL ENCOUNTER (OUTPATIENT)
Dept: PHYSICAL THERAPY | Age: 79
Setting detail: RECURRING SERIES
End: 2024-12-31
Payer: MEDICARE

## 2024-12-31 NOTE — PROGRESS NOTES
X 10 mins X 5 mins X 10 mins  X 15 mins     MANUAL THERAPY: ( minutes):   Soft tissue mobilization was utilized and necessary because of the patient's painful spasm and restricted motion of soft tissue.   Date Type Location Comments   12/31/2024 Internal assessment/treatment Via vaginal canal Not today                                        (Used abbreviations: MET - muscle energy technique; SCS- Strain counter strain; CTM-Connective tissue mobilizations; CR- Contract/Relax; SP- Sustained pressure; PIT- Positional inhibition techniques; STM Soft -tissue mobilization; MM- Myofascial mobilization; TrP-Trigger point release; IASTM- Instrument assisted soft tissue mobilizations, TDN-Trigger point dry needling)    Pt gives verbal consent to internal vaginal assessment/treatment without chaperon present.        Treatment/Session Summary:    Treatment Assessment:   Pt reports good understanding of plan of care, as well as prescribed home exercise program. All questions were answered to pts satisfaction. Pt was invited to call with any further questions or concerns.   Patient needing min/mod cues for exercises. Self-correcting for proper breath coordination. Patient to continue with HEP.  Communication/Consultation:  None today  Equipment provided today:  None  Recommendations/Intent for next treatment session: Next visit will focus on   2. Coordination training   5. Rectal exam     >Total Treatment Billable Duration:  53 minutes        Cecille Martin PT         Charge Capture  Events  [employer] Portal  Appt Desk  Attendance Report     Future Appointments   Date Time Provider Department Center   12/31/2024 11:00 AM Cecille Martin, PT [facility]   1/28/2025  2:00 PM Florin Luna DO [facility]

## 2025-01-28 ENCOUNTER — OFFICE VISIT (OUTPATIENT)
Dept: ENT CLINIC | Age: 80
End: 2025-01-28
Payer: MEDICARE

## 2025-01-28 VITALS
SYSTOLIC BLOOD PRESSURE: 124 MMHG | BODY MASS INDEX: 32.62 KG/M2 | WEIGHT: 203 LBS | RESPIRATION RATE: 17 BRPM | HEIGHT: 66 IN | DIASTOLIC BLOOD PRESSURE: 78 MMHG

## 2025-01-28 DIAGNOSIS — J32.9 CHRONIC RHINOSINUSITIS: Primary | ICD-10-CM

## 2025-01-28 DIAGNOSIS — Z98.890 S/P FESS (FUNCTIONAL ENDOSCOPIC SINUS SURGERY): ICD-10-CM

## 2025-01-28 DIAGNOSIS — H61.23 IMPACTED CERUMEN, BILATERAL: ICD-10-CM

## 2025-01-28 PROCEDURE — 99213 OFFICE O/P EST LOW 20 MIN: CPT | Performed by: STUDENT IN AN ORGANIZED HEALTH CARE EDUCATION/TRAINING PROGRAM

## 2025-01-28 PROCEDURE — 1036F TOBACCO NON-USER: CPT | Performed by: STUDENT IN AN ORGANIZED HEALTH CARE EDUCATION/TRAINING PROGRAM

## 2025-01-28 PROCEDURE — G8427 DOCREV CUR MEDS BY ELIG CLIN: HCPCS | Performed by: STUDENT IN AN ORGANIZED HEALTH CARE EDUCATION/TRAINING PROGRAM

## 2025-01-28 PROCEDURE — 1160F RVW MEDS BY RX/DR IN RCRD: CPT | Performed by: STUDENT IN AN ORGANIZED HEALTH CARE EDUCATION/TRAINING PROGRAM

## 2025-01-28 PROCEDURE — 1090F PRES/ABSN URINE INCON ASSESS: CPT | Performed by: STUDENT IN AN ORGANIZED HEALTH CARE EDUCATION/TRAINING PROGRAM

## 2025-01-28 PROCEDURE — 1159F MED LIST DOCD IN RCRD: CPT | Performed by: STUDENT IN AN ORGANIZED HEALTH CARE EDUCATION/TRAINING PROGRAM

## 2025-01-28 PROCEDURE — G8400 PT W/DXA NO RESULTS DOC: HCPCS | Performed by: STUDENT IN AN ORGANIZED HEALTH CARE EDUCATION/TRAINING PROGRAM

## 2025-01-28 PROCEDURE — 69210 REMOVE IMPACTED EAR WAX UNI: CPT | Performed by: STUDENT IN AN ORGANIZED HEALTH CARE EDUCATION/TRAINING PROGRAM

## 2025-01-28 PROCEDURE — 1123F ACP DISCUSS/DSCN MKR DOCD: CPT | Performed by: STUDENT IN AN ORGANIZED HEALTH CARE EDUCATION/TRAINING PROGRAM

## 2025-01-28 PROCEDURE — 3078F DIAST BP <80 MM HG: CPT | Performed by: STUDENT IN AN ORGANIZED HEALTH CARE EDUCATION/TRAINING PROGRAM

## 2025-01-28 PROCEDURE — G8417 CALC BMI ABV UP PARAM F/U: HCPCS | Performed by: STUDENT IN AN ORGANIZED HEALTH CARE EDUCATION/TRAINING PROGRAM

## 2025-01-28 PROCEDURE — 3074F SYST BP LT 130 MM HG: CPT | Performed by: STUDENT IN AN ORGANIZED HEALTH CARE EDUCATION/TRAINING PROGRAM

## 2025-01-28 RX ORDER — PANCRELIPASE LIPASE, PANCRELIPASE PROTEASE, PANCRELIPASE AMYLASE 40000; 126000; 168000 [USP'U]/1; [USP'U]/1; [USP'U]/1
CAPSULE, DELAYED RELEASE ORAL
COMMUNITY
Start: 2025-01-07

## 2025-01-28 RX ORDER — FERROUS SULFATE 325(65) MG
325 TABLET ORAL EVERY OTHER DAY
COMMUNITY
Start: 2024-10-24

## 2025-01-28 ASSESSMENT — ENCOUNTER SYMPTOMS
SINUS PRESSURE: 0
SINUS PAIN: 0
EYE PAIN: 0
EYE DISCHARGE: 0
EYE ITCHING: 0
CONSTIPATION: 0
NAUSEA: 0
FACIAL SWELLING: 0
SHORTNESS OF BREATH: 0
DIARRHEA: 0
CHOKING: 0
APNEA: 0
WHEEZING: 0
COUGH: 0
STRIDOR: 0

## 2025-01-28 NOTE — PROGRESS NOTES
HPI:  Sheila Clinton is a 79 y.o. female seen   Chief Complaint   Patient presents with    Cerumen Impaction     Patient presents today for 5 MO cerumen removal and ear exam .     Sinus Problem     Patient states that she feels as if she had the beginning of a sinus infection . She'd like to know if she should use antibiotic sinus rinse .        History of Present Illness  The patient is an 79-year-old female with a history of chronic rhinosinusitis (CRS) and cerumen impactions, here for a routine follow-up  History of FESS by Dr. Aponte many years ago.  She discontinued mupirocin, budesonide, and sinus irrigations over the past year and feels she is having a sinusitis event.    She reports a severe cough and suspects a sinus infection. Saline rinse provides some relief, and she is considering an antibiotic rinse, which has been effective in the past. She also reports excessive mucus production and postnasal drainage. No dry eye syndrome. She had COVID-19 during [place], characterized by a severe cough.    ALLERGIES  Allergic to many antibiotics.    MEDICATIONS  Discontinued: mupirocin, budesonide.      There has been a gradual onset of bilateral  ear fullness that has been persistent for months. The course has been increasing and has been moderate in degree. The ear fullness has been without the new onset of tinnitus, recurrent otitis media, otalgia, otorrhea or vertigo. The symptoms are described as being in both ears.     Past Medical History, Past Surgical History, Family history, Social History, and Medications were all reviewed with the patient today and updated as necessary.     Allergies   Allergen Reactions    Cefaclor Diarrhea and Hives     IV OK  Other reaction(s): Hives/Swelling-Allergy  Other reaction(s): hives    Cefuroxime Axetil Diarrhea and Hives     IV OK  Other reaction(s): Hives/Swelling-Allergy    Ciprofloxacin Hcl Hives     Other reaction(s): Hives/Swelling-Allergy    Clarithromycin Diarrhea

## 2025-05-08 ENCOUNTER — TRANSCRIBE ORDERS (OUTPATIENT)
Dept: SCHEDULING | Age: 80
End: 2025-05-08

## 2025-05-08 DIAGNOSIS — Z12.31 VISIT FOR SCREENING MAMMOGRAM: Primary | ICD-10-CM

## 2025-05-09 ENCOUNTER — PROCEDURE VISIT (OUTPATIENT)
Dept: AUDIOLOGY | Age: 80
End: 2025-05-09

## 2025-05-09 DIAGNOSIS — H90.3 SENSORINEURAL HEARING LOSS, BILATERAL: Primary | ICD-10-CM

## 2025-05-09 NOTE — PROGRESS NOTES
HEARING AID CHECK    Left Oticon Opn S 3 hearing aid (S/N: [id number]) is non-functional and will not charge.  The device will be sent to [facility] for out-of-warranty repair.  Upon receipt, an HAC should be scheduled to verify programming and sync the left aid with the right.    Patient uses a length #2(38) L speaker with retention line coupled with a 6 mm open dome.      Andre Frazier, CCC-A

## 2025-05-21 ENCOUNTER — PROCEDURE VISIT (OUTPATIENT)
Dept: AUDIOLOGY | Age: 80
End: 2025-05-21

## 2025-05-21 DIAGNOSIS — Z97.4 WEARS HEARING AID: Primary | ICD-10-CM

## 2025-05-21 NOTE — PROGRESS NOTES
HEARING AID CHECK    AMPLIFICATION INFORMATION  Warranty Expiration: R:  / L: 2026  Make/model: Oticon Opn S 3 miniRITE R  S/N Right: [id number]  / Left: [id number]  Speaker length/strength: #2(37) with retention lines  Couplin mm double bass domes  HA Comments: Adaptation level 1; Manual VC activated     CONCERN  Patient returns to  her left hearing aid which was sent to [facility] for out-of-warranty repair.     DX/PROCEDURES COMPLETED  A replacement left device was issued with serial number [id number] and repair expiration date of 2026.  Programming was verified on both devices today.  Changed from 6 mm open domes to 6 mm double bass domes and adjusted acoustic parameters accordingly in software.  Patient reported an echo sensation with her own voice.  Counseled re: adaptation; pt. Verbalized understanding.    Enabled manual VC which patient did not have previously.  Instructed patient on how to adjust volume, and patient was pleased to have this flexibility.    OUTCOME  Both aids passed listening checks and were returned to patient in good working condition.     PLAN  RTC PRN      Andre Frazier, ALLISON-A

## 2025-06-02 ENCOUNTER — HOSPITAL ENCOUNTER (OUTPATIENT)
Dept: MAMMOGRAPHY | Age: 80
Discharge: HOME | End: 2025-06-05
Payer: MEDICARE

## 2025-06-02 VITALS — HEIGHT: 66 IN | BODY MASS INDEX: 32.14 KG/M2 | WEIGHT: 200 LBS

## 2025-06-02 DIAGNOSIS — Z12.31 VISIT FOR SCREENING MAMMOGRAM: ICD-10-CM

## 2025-06-02 PROCEDURE — 77063 BREAST TOMOSYNTHESIS BI: CPT

## 2025-06-06 ENCOUNTER — CLINICAL DOCUMENTATION (OUTPATIENT)
Dept: PHYSICAL THERAPY | Age: 80
End: 2025-06-06

## 2025-06-06 NOTE — THERAPY DISCHARGE
[facility] @ [address] [place] [address]  Phone: [phone]  Fax: [phone]    OUTPATIENT PHYSICAL THERAPY  Discontinuation Summary 6/6/2025  Episode  Appt Desk         Sheila Clinton has been seen in physical therapy for 8  visits from 10/10/24 to 12/12/24, with 0 cancellations and 0 no shows. Ms. Clinton's therapy has come to an end at this time due to: Patient did not return for/schedule additional treatment    Physical Therapy Goals:  Not assessed at time of d/c    Cecille Martin, PT

## 2025-06-17 ENCOUNTER — PROCEDURE VISIT (OUTPATIENT)
Dept: AUDIOLOGY | Age: 80
End: 2025-06-17

## 2025-06-17 DIAGNOSIS — Z97.4 WEARS HEARING AID: Primary | ICD-10-CM

## 2025-06-17 NOTE — PROGRESS NOTES
Patient reported her hearing aids amplify everything, even soft sounds like rustling of clothes and her own breathing.  Decreased from adaptation 3 to 2.  Decreased gain for soft inputs by 2 dB.  Increased noise management.  Patient please with changes.  Re-instructed on manual volume changes per patient request.    Patient requested a user manual.  Will order this from [employer] and call patient for  P/U upon receipt.    Andre Frazier, CCC-A

## 2025-07-28 ENCOUNTER — OFFICE VISIT (OUTPATIENT)
Dept: ENT CLINIC | Age: 80
End: 2025-07-28
Payer: MEDICARE

## 2025-07-28 VITALS
HEART RATE: 80 BPM | WEIGHT: 213 LBS | OXYGEN SATURATION: 97 % | RESPIRATION RATE: 17 BRPM | HEIGHT: 66 IN | BODY MASS INDEX: 34.23 KG/M2

## 2025-07-28 DIAGNOSIS — Z98.890 S/P FESS (FUNCTIONAL ENDOSCOPIC SINUS SURGERY): ICD-10-CM

## 2025-07-28 DIAGNOSIS — J32.9 CHRONIC RHINOSINUSITIS: Primary | ICD-10-CM

## 2025-07-28 DIAGNOSIS — H61.23 IMPACTED CERUMEN, BILATERAL: ICD-10-CM

## 2025-07-28 PROCEDURE — G8400 PT W/DXA NO RESULTS DOC: HCPCS | Performed by: STUDENT IN AN ORGANIZED HEALTH CARE EDUCATION/TRAINING PROGRAM

## 2025-07-28 PROCEDURE — 99213 OFFICE O/P EST LOW 20 MIN: CPT | Performed by: STUDENT IN AN ORGANIZED HEALTH CARE EDUCATION/TRAINING PROGRAM

## 2025-07-28 PROCEDURE — 1160F RVW MEDS BY RX/DR IN RCRD: CPT | Performed by: STUDENT IN AN ORGANIZED HEALTH CARE EDUCATION/TRAINING PROGRAM

## 2025-07-28 PROCEDURE — G8427 DOCREV CUR MEDS BY ELIG CLIN: HCPCS | Performed by: STUDENT IN AN ORGANIZED HEALTH CARE EDUCATION/TRAINING PROGRAM

## 2025-07-28 PROCEDURE — 1090F PRES/ABSN URINE INCON ASSESS: CPT | Performed by: STUDENT IN AN ORGANIZED HEALTH CARE EDUCATION/TRAINING PROGRAM

## 2025-07-28 PROCEDURE — 1159F MED LIST DOCD IN RCRD: CPT | Performed by: STUDENT IN AN ORGANIZED HEALTH CARE EDUCATION/TRAINING PROGRAM

## 2025-07-28 PROCEDURE — 1036F TOBACCO NON-USER: CPT | Performed by: STUDENT IN AN ORGANIZED HEALTH CARE EDUCATION/TRAINING PROGRAM

## 2025-07-28 PROCEDURE — 69210 REMOVE IMPACTED EAR WAX UNI: CPT | Performed by: STUDENT IN AN ORGANIZED HEALTH CARE EDUCATION/TRAINING PROGRAM

## 2025-07-28 PROCEDURE — 1123F ACP DISCUSS/DSCN MKR DOCD: CPT | Performed by: STUDENT IN AN ORGANIZED HEALTH CARE EDUCATION/TRAINING PROGRAM

## 2025-07-28 PROCEDURE — G8417 CALC BMI ABV UP PARAM F/U: HCPCS | Performed by: STUDENT IN AN ORGANIZED HEALTH CARE EDUCATION/TRAINING PROGRAM

## 2025-07-28 RX ORDER — CLINDAMYCIN HYDROCHLORIDE 300 MG/1
CAPSULE ORAL
COMMUNITY
Start: 2025-07-27

## 2025-07-28 RX ORDER — RIFAXIMIN 550 MG/1
TABLET ORAL
COMMUNITY
Start: 2025-04-22

## 2025-07-28 ASSESSMENT — ENCOUNTER SYMPTOMS
CONSTIPATION: 0
APNEA: 0
FACIAL SWELLING: 0
SHORTNESS OF BREATH: 0
CHOKING: 0
WHEEZING: 0
STRIDOR: 0
SINUS PAIN: 0
DIARRHEA: 0
NAUSEA: 0
SINUS PRESSURE: 0
EYE PAIN: 0
EYE ITCHING: 0
COUGH: 0
EYE DISCHARGE: 0

## 2025-07-28 NOTE — PROGRESS NOTES
HPI:    Sheila Clinton is a 79 y.o. female seen Established   Chief Complaint   Patient presents with    Follow-up     Patient presents today for 6 MO f/u . Patient continues to have rhinitis and cough but both are managed well .        History of Present Illness  The patient is an 79-year-old female presenting for a follow-up evaluation. She has chronic sinusitis, with FESS performed by Dr. Aponte many years ago. At her last follow-up in January 2025, she was advised to restart mupirocin and budesonide sinus irrigations due to lingering sinusitis following a COVID-19 infection in late 2024. This visit is for a scheduled 6-month follow-up for a sinus and ear exam.    She has not been using the sinus rinse recently. Her condition worsens when she sleeps on her right side.    She had knuckle surgery for arthritis and is currently seeing a hand physical therapist. She has one more visit left and is wearing a splint.    PAST SURGICAL HISTORY:  Endoscopic sinus surgery by Dr. Aponte many years ago  Knuckle surgery for arthritis    FAMILY HISTORY  Grandmother had severe arthritis and hip replacement.        Past Medical History, Past Surgical History, Family history, Social History, and Medications were all reviewed with the patient today and updated as necessary.     Allergies   Allergen Reactions    Cefaclor Diarrhea and Hives     IV OK  Other reaction(s): Hives/Swelling-Allergy  Other reaction(s): hives    Cefuroxime Axetil Diarrhea and Hives     IV OK  Other reaction(s): Hives/Swelling-Allergy    Ciprofloxacin Hcl Hives     Other reaction(s): Hives/Swelling-Allergy    Clarithromycin Diarrhea and Hives     IV is OK  Other reaction(s): Hives/Swelling-Allergy  Other reaction(s): hives, Hives/Swelling-Allergy  IV is OK  Other reaction(s): Hives/Swelling-Allergy  IV is OK  Other reaction(s): Hives/Swelling-Allergy    Other reaction(s): hives    Fluconazole Diarrhea and Hives     Other reaction(s):

## 2025-07-29 ENCOUNTER — OFFICE VISIT (OUTPATIENT)
Dept: ORTHOPEDIC SURGERY | Age: 80
End: 2025-07-29
Payer: MEDICARE

## 2025-07-29 DIAGNOSIS — M25.571 RIGHT ANKLE PAIN, UNSPECIFIED CHRONICITY: Primary | ICD-10-CM

## 2025-07-29 PROCEDURE — G8400 PT W/DXA NO RESULTS DOC: HCPCS | Performed by: ORTHOPAEDIC SURGERY

## 2025-07-29 PROCEDURE — G8417 CALC BMI ABV UP PARAM F/U: HCPCS | Performed by: ORTHOPAEDIC SURGERY

## 2025-07-29 PROCEDURE — G8428 CUR MEDS NOT DOCUMENT: HCPCS | Performed by: ORTHOPAEDIC SURGERY

## 2025-07-29 PROCEDURE — 1123F ACP DISCUSS/DSCN MKR DOCD: CPT | Performed by: ORTHOPAEDIC SURGERY

## 2025-07-29 PROCEDURE — 99214 OFFICE O/P EST MOD 30 MIN: CPT | Performed by: ORTHOPAEDIC SURGERY

## 2025-07-29 PROCEDURE — 1090F PRES/ABSN URINE INCON ASSESS: CPT | Performed by: ORTHOPAEDIC SURGERY

## 2025-07-29 PROCEDURE — 1036F TOBACCO NON-USER: CPT | Performed by: ORTHOPAEDIC SURGERY

## 2025-07-29 RX ORDER — METHYLPREDNISOLONE 4 MG/1
TABLET ORAL
Qty: 1 KIT | Refills: 0 | Status: SHIPPED | OUTPATIENT
Start: 2025-07-29 | End: 2025-08-04

## 2025-07-29 NOTE — PROGRESS NOTES
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  If not improving consider PT and boot     CC: Inner foot pain    History of Present Illness  The patient presents with right ankle pain.    The patient reports discomfort localized to the posterior aspect of the right heel, which has a history of surgical intervention. The pain has progressively worsened over the past month. The pain is most pronounced in the morning but diminishes throughout the day, although the patient continues to exhibit a limp. The pain intensity is rated at 2-3/10 and is managed with acetaminophen.    The patient was last evaluated in March 2022 and was referred to Dr. Renato Contreras for knuckle arthroplasty due to arthritis. The patient's surgical history includes procedures on the toes and ankle.      ROS/Meds/PSH/PMH/FH/SH: A full history is at the bottom of the note.     ROS: Patient Denies fever/chills, headache, visual changes, chest pain, shortness of breath, and nausea/vomiting/diarrhea     Physical Examination:  Right lower: 2+ DP. Toes wwp x 5. EHL/FHL/EDL/FDL intact w/ full strength.  GS/AT 5/5 strength.  Good ROM at ankle.  Pain with resisted inversion with Some weakness. They are TTP over the PT behind the medial mal down to the navicular insertion - this is accompanied by edema.  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    They are not TTP at the sinus tarsi.   They can perform a single leg heel rise.      Gastroc Contracture noted on silverskoid exam: With the hindfoot in neutral and forefoot supinated ankle dorsiflexion is diminished with knee in extension when compared to the knee at 90deg    Neuro:  normal SILT to s/s/sp/dp/t.  Reflexes normal: 1+ patella reflex

## 2025-08-04 ENCOUNTER — TELEPHONE (OUTPATIENT)
Dept: ORTHOPEDIC SURGERY | Age: 80
End: 2025-08-04

## 2025-08-19 ENCOUNTER — OFFICE VISIT (OUTPATIENT)
Dept: ORTHOPEDIC SURGERY | Age: 80
End: 2025-08-19
Payer: MEDICARE

## 2025-08-19 DIAGNOSIS — M25.571 RIGHT ANKLE PAIN, UNSPECIFIED CHRONICITY: Primary | ICD-10-CM

## 2025-08-19 PROCEDURE — 1123F ACP DISCUSS/DSCN MKR DOCD: CPT | Performed by: ORTHOPAEDIC SURGERY

## 2025-08-19 PROCEDURE — G8417 CALC BMI ABV UP PARAM F/U: HCPCS | Performed by: ORTHOPAEDIC SURGERY

## 2025-08-19 PROCEDURE — 1036F TOBACCO NON-USER: CPT | Performed by: ORTHOPAEDIC SURGERY

## 2025-08-19 PROCEDURE — G8428 CUR MEDS NOT DOCUMENT: HCPCS | Performed by: ORTHOPAEDIC SURGERY

## 2025-08-19 PROCEDURE — 1090F PRES/ABSN URINE INCON ASSESS: CPT | Performed by: ORTHOPAEDIC SURGERY

## 2025-08-19 PROCEDURE — 99214 OFFICE O/P EST MOD 30 MIN: CPT | Performed by: ORTHOPAEDIC SURGERY

## 2025-08-19 PROCEDURE — G8400 PT W/DXA NO RESULTS DOC: HCPCS | Performed by: ORTHOPAEDIC SURGERY

## 2025-08-19 RX ORDER — TRAMADOL HYDROCHLORIDE 50 MG/1
50 TABLET ORAL EVERY 4 HOURS PRN
Qty: 42 TABLET | Refills: 0 | Status: SHIPPED | OUTPATIENT
Start: 2025-08-19 | End: 2025-08-26

## (undated) DEVICE — SUT ETHLN 3-0 18IN PS1 BLK --

## (undated) DEVICE — SURGICAL PROCEDURE PACK BASIC ST FRANCIS

## (undated) DEVICE — SOLUTION IRRIG 1000ML 09% SOD CHL USP PIC PLAS CONTAINER

## (undated) DEVICE — SUTURE STRATAFIX SPRL MCRYL + SZ 3-0 L18IN ABSRB UD PS-2 SXMP1B107

## (undated) DEVICE — CONTAINER SPEC HISTOLOGY 900ML POLYPR

## (undated) DEVICE — SUTURE VCRL SZ 3-0 L18IN ABSRB UD PS-2 L19MM 1/2 CIR J497G

## (undated) DEVICE — BANDAGE,ELASTIC,ESMARK,STERILE,4"X9',LF: Brand: MEDLINE

## (undated) DEVICE — SUTURE ETHLN SZ 4-0 L18IN NONABSORBABLE BLK L19MM PS-2 3/8 1667H

## (undated) DEVICE — SUTURE ABSRB X-1 REV CUT 1/2 CIR 22MM UD BRAID 27IN SZ 3-0 J458H

## (undated) DEVICE — DRAPE,TOP,102X53,STERILE: Brand: MEDLINE

## (undated) DEVICE — Device

## (undated) DEVICE — DRAPE,CHEST,FENES,15X10,STERIL: Brand: MEDLINE

## (undated) DEVICE — SUT ETHBND 2-0 30IN CT2 GRN --

## (undated) DEVICE — 4-PORT MANIFOLD: Brand: NEPTUNE 2

## (undated) DEVICE — TUBING SUCT L9FT FOR AUTOFUSE INFLTR SYS

## (undated) DEVICE — REM POLYHESIVE ADULT PATIENT RETURN ELECTRODE: Brand: VALLEYLAB

## (undated) DEVICE — NEEDLE HYPO 21GA L1.5IN INTRAMUSCULAR S STL LATCH BVL UP

## (undated) DEVICE — SUTURE VCRL SZ 3-0 L27IN ABSRB UD L26MM SH 1/2 CIR J416H

## (undated) DEVICE — 2000CC GUARDIAN II: Brand: GUARDIAN

## (undated) DEVICE — C-ARM: Brand: UNBRANDED

## (undated) DEVICE — DRESSING,GAUZE,XEROFORM,CURAD,5"X9",ST: Brand: CURAD

## (undated) DEVICE — SYR 10ML LUER LOK 1/5ML GRAD --

## (undated) DEVICE — KIT US PRB 48IN FOR SITE-RITE VISION II

## (undated) DEVICE — GARMENT,MEDLINE,DVT,INT,CALF,LG, GEN2: Brand: MEDLINE

## (undated) DEVICE — SUTURE STRATAFIX SZ 3-0 L14CM NONABSORBABLE UD L19MM FS-2 SXMP2B406

## (undated) DEVICE — BINDER ABD M/L H12IN FOR 46-62IN WHT 4 SLD PNL DSGN HOOP

## (undated) DEVICE — NEEDLE SPNL 22GA L3.5IN BLK HUB S STL REG WALL FIT STYL W/

## (undated) DEVICE — SPONGE LAP 18X18IN STRL -- 5/PK

## (undated) DEVICE — NDL FLTR TIP 5 MIC 18GX1.5IN --

## (undated) DEVICE — DRSG GZ PETROLATM OCL 3X18IN -- CURAD

## (undated) DEVICE — TUBING ASPIR L8IN OD3/8IN CLR VYN DISP

## (undated) DEVICE — SOLUTION IV 1000ML 0.9% SOD CHL

## (undated) DEVICE — RESERVOIR,SUCTION,100CC,SILICONE: Brand: MEDLINE

## (undated) DEVICE — DRAIN SURG 3/4 W10MMXL20CM 100% SIL PERF FLAT HUBLESS

## (undated) DEVICE — GOWN,REINFORCED,POLY,AURORA,XXLARGE,STR: Brand: MEDLINE

## (undated) DEVICE — DRAPE TWL SURG 16X26IN BLU ORB04] ALLCARE INC]

## (undated) DEVICE — BANDAGE COMPR W6INXL10YD ST M E WHITE/BEIGE

## (undated) DEVICE — ELECTRODE NDL 2.8IN COAT VALLEYLAB

## (undated) DEVICE — BIT DRL DIA3.5MM TRIM-IT

## (undated) DEVICE — INSTRMT BB TAK LP MTP PLT --

## (undated) DEVICE — DRAPE SHT 3 QTR PROXIMA 53X77 --

## (undated) DEVICE — PADDING CAST W6INXL4YD ST COT COHESIVE HND TEARABLE SPEC

## (undated) DEVICE — STRIP,CLOSURE,WOUND,MEDI-STRIP,1/2X4: Brand: MEDLINE

## (undated) DEVICE — SPONGE GZ W6XL6IN COT 6 PLY SUP FLUF EXTRA ABSRB FOR PRE OP

## (undated) DEVICE — GARMENT,MEDLINE,DVT,INT,CALF,MED, GEN2: Brand: MEDLINE

## (undated) DEVICE — SYRINGE CATH TIP 50ML

## (undated) DEVICE — PREP SKN CHLRAPRP APL 26ML STR --

## (undated) DEVICE — STAPLER SKIN SQ 30 ABSRB STPL DISP INSORB

## (undated) DEVICE — BLADE ELECTRODE: Brand: EDGE

## (undated) DEVICE — KIT PROC W DRP AND DRUG COMP FOR PRTBL HANDHELD IMAGER

## (undated) DEVICE — BANDAGE,GAUZE,BULKEE II,4.5"X4.1YD,STRL: Brand: MEDLINE

## (undated) DEVICE — SHEET, DRAPE, SPLIT, STERILE: Brand: MEDLINE

## (undated) DEVICE — SUTURE VCRL SZ 2-0 L27IN ABSRB UD L26MM CT-2 1/2 CIR J269H

## (undated) DEVICE — GOWN,SIRUS,NONRNF,SETINSLV,XL,20/CS: Brand: MEDLINE

## (undated) DEVICE — SUTURE STRATAFIX SPRL SZ 4-0 L5IN ABSRB UD FS-2 L19MM 3/8 SXMP2B407

## (undated) DEVICE — SYR 50ML LR LCK 1ML GRAD NSAF --

## (undated) DEVICE — SPLINT MAT XF SPEC 5X30IN --

## (undated) DEVICE — MASTISOL ADHESIVE LIQ 2/3ML

## (undated) DEVICE — CONTAINER,SPECIMEN,O.R.STRL,4.5OZ: Brand: MEDLINE

## (undated) DEVICE — ZIMMER® STERILE DISPOSABLE TOURNIQUET CUFF WITH PLC, DUAL PORT, SINGLE BLADDER, 30 IN. (76 CM)

## (undated) DEVICE — MINOR SPLIT GENERAL: Brand: MEDLINE INDUSTRIES, INC.

## (undated) DEVICE — BIT DRL DIA2.6MM CANN FOR ANK FRAC MGMT SYS

## (undated) DEVICE — SUT SLK 2-0SH 30IN BLK --

## (undated) DEVICE — FOOT DR TOLLISON & WOMACK: Brand: MEDLINE INDUSTRIES, INC.

## (undated) DEVICE — PAD,ABDOMINAL,5"X9",ST,LF,25/BX: Brand: MEDLINE INDUSTRIES, INC.

## (undated) DEVICE — BIT DRL DIA2.5MM FOR ANK FRAC MGMT SYS

## (undated) DEVICE — TETRA-FLEX CF WOVEN LATEX FREE ELASTIC BANDAGE 6" X 11 YD: Brand: TETRA-FLEX™CF

## (undated) DEVICE — STERILE PVP: Brand: MEDLINE INDUSTRIES, INC.

## (undated) DEVICE — NEPTUNE E-SEP SMOKE EVACUATION PENCIL, COATED, 70MM BLADE, PUSH BUTTON SWITCH: Brand: NEPTUNE E-SEP

## (undated) DEVICE — MICRODISSECTION NEEDLE STRAIGHT SLEEVE: Brand: COLORADO

## (undated) DEVICE — INTENDED FOR TISSUE SEPARATION, AND OTHER PROCEDURES THAT REQUIRE A SHARP SURGICAL BLADE TO PUNCTURE OR CUT.: Brand: BARD-PARKER ®  SAFETY SCALPED